# Patient Record
Sex: FEMALE | Race: BLACK OR AFRICAN AMERICAN | NOT HISPANIC OR LATINO | Employment: FULL TIME | URBAN - METROPOLITAN AREA
[De-identification: names, ages, dates, MRNs, and addresses within clinical notes are randomized per-mention and may not be internally consistent; named-entity substitution may affect disease eponyms.]

---

## 2019-03-05 ENCOUNTER — TELEPHONE (OUTPATIENT)
Dept: INTERNAL MEDICINE | Facility: CLINIC | Age: 49
End: 2019-03-05

## 2019-03-05 NOTE — TELEPHONE ENCOUNTER
----- Message from Shari Briceno sent at 3/5/2019  8:38 AM EST -----  Contact: Patient  Patient called and requested that you give her a call back in regards to back pain. Please call @ [phone]. Thank you.

## 2019-03-05 NOTE — TELEPHONE ENCOUNTER
S/W PT, STATES SHE IS HAVING ACUTE BACK PAIN THAT HAS WORSENED AND NEEDS TO BE SEEN ASAP.  STATES SHE IS PT OF DR ALICEA BUT HAS NOT BEEN SEEN IN FEW YEARS.  INFORMED DR ALICEA OUT OF [facility] THIS WEEK.  STATES SHE CANNOT WAIT.  EXPL CAN SCHEDULE WITH EXTENDER FOR ACUTE BACK PAIN BUT ALSO NEEDS TO SCHEDULE RE-EST APPT WITH DR ALICEA UPON RETURN.  VERB UNDERSTANDING AND AGREEMENT. TRANSFERRED TO  TO SCHEDULE APPT.

## 2021-12-28 ENCOUNTER — HOSPITAL ENCOUNTER (EMERGENCY)
Facility: HOSPITAL | Age: 51
Discharge: HOME | End: 2021-12-28
Attending: EMERGENCY MEDICINE | Admitting: EMERGENCY MEDICINE

## 2021-12-28 VITALS
DIASTOLIC BLOOD PRESSURE: 106 MMHG | HEIGHT: 64 IN | BODY MASS INDEX: 26.63 KG/M2 | SYSTOLIC BLOOD PRESSURE: 181 MMHG | RESPIRATION RATE: 18 BRPM | OXYGEN SATURATION: 98 % | WEIGHT: 156 LBS | HEART RATE: 103 BPM | TEMPERATURE: 102.8 F

## 2021-12-28 DIAGNOSIS — R50.9 FEBRILE ILLNESS: ICD-10-CM

## 2021-12-28 DIAGNOSIS — U07.1 COVID-19 VIRUS INFECTION: Primary | ICD-10-CM

## 2021-12-28 LAB
FLUAV RNA RESP QL NAA+PROBE: NOT DETECTED
FLUBV RNA RESP QL NAA+PROBE: NOT DETECTED
SARS-COV-2 RNA RESP QL NAA+PROBE: DETECTED

## 2021-12-28 PROCEDURE — C9803 HOPD COVID-19 SPEC COLLECT: HCPCS | Performed by: EMERGENCY MEDICINE

## 2021-12-28 PROCEDURE — 87636 SARSCOV2 & INF A&B AMP PRB: CPT | Performed by: EMERGENCY MEDICINE

## 2021-12-28 PROCEDURE — 99283 EMERGENCY DEPT VISIT LOW MDM: CPT

## 2021-12-28 PROCEDURE — 63710000001 ONDANSETRON ODT 4 MG TABLET DISPERSIBLE: Performed by: EMERGENCY MEDICINE

## 2021-12-28 RX ORDER — ONDANSETRON 4 MG/1
4 TABLET, ORALLY DISINTEGRATING ORAL 4 TIMES DAILY PRN
Qty: 15 TABLET | Refills: 0 | Status: SHIPPED | OUTPATIENT
Start: 2021-12-28 | End: 2022-05-27

## 2021-12-28 RX ORDER — ACETAMINOPHEN 325 MG/1
650 TABLET ORAL ONCE
Status: COMPLETED | OUTPATIENT
Start: 2021-12-28 | End: 2021-12-28

## 2021-12-28 RX ORDER — NAPROXEN 500 MG/1
500 TABLET ORAL 2 TIMES DAILY WITH MEALS
Qty: 20 TABLET | Refills: 0 | OUTPATIENT
Start: 2021-12-28 | End: 2021-12-28 | Stop reason: SDUPTHER

## 2021-12-28 RX ORDER — ONDANSETRON 4 MG/1
4 TABLET, ORALLY DISINTEGRATING ORAL 4 TIMES DAILY PRN
Qty: 15 TABLET | Refills: 0 | OUTPATIENT
Start: 2021-12-28 | End: 2021-12-28 | Stop reason: SDUPTHER

## 2021-12-28 RX ORDER — ACETAMINOPHEN 500 MG
500 TABLET ORAL EVERY 4 HOURS PRN
Qty: 20 TABLET | Refills: 0 | Status: SHIPPED | OUTPATIENT
Start: 2021-12-28

## 2021-12-28 RX ORDER — ONDANSETRON 4 MG/1
4 TABLET, ORALLY DISINTEGRATING ORAL ONCE
Status: COMPLETED | OUTPATIENT
Start: 2021-12-28 | End: 2021-12-28

## 2021-12-28 RX ORDER — ONDANSETRON 4 MG/1
4 TABLET, ORALLY DISINTEGRATING ORAL ONCE
Status: DISCONTINUED | OUTPATIENT
Start: 2021-12-28 | End: 2021-12-28

## 2021-12-28 RX ORDER — ACETAMINOPHEN 500 MG
500 TABLET ORAL EVERY 4 HOURS PRN
Qty: 20 TABLET | Refills: 0 | OUTPATIENT
Start: 2021-12-28 | End: 2021-12-28 | Stop reason: SDUPTHER

## 2021-12-28 RX ORDER — NAPROXEN 500 MG/1
500 TABLET ORAL 2 TIMES DAILY WITH MEALS
Qty: 20 TABLET | Refills: 0 | Status: SHIPPED | OUTPATIENT
Start: 2021-12-28 | End: 2022-05-27

## 2021-12-28 RX ADMIN — ONDANSETRON HYDROCHLORIDE 4 MG: 4 TABLET, ORALLY DISINTEGRATING ORAL at 22:30

## 2021-12-28 RX ADMIN — ACETAMINOPHEN 650 MG: 325 TABLET, FILM COATED ORAL at 22:30

## 2021-12-29 ENCOUNTER — READMISSION MANAGEMENT (OUTPATIENT)
Dept: CALL CENTER | Facility: HOSPITAL | Age: 51
End: 2021-12-29

## 2021-12-29 NOTE — OUTREACH NOTE
Prep Survey      Responses   [facility] patient discharged from? [place]   Is LACE score < 7 ? Yes   Emergency Room discharge w/ pulse ox? Yes   Eligibility Readm Mgmt   Discharge diagnosis Covid 19   Does the patient have one of the following disease processes/diagnoses(primary or secondary)? COVID-19   Does the patient have Home health ordered? No   Is there a DME ordered? Yes   What DME was ordered? Sent home w/ pulse ox   General alerts for this patient ED discharge - call x 3 only   Prep survey completed? Yes          Jaki Latham RN

## 2021-12-29 NOTE — OUTREACH NOTE
COVID-19 Week 1 Survey      Responses   [facility] patient discharged from? [place]   Does the patient have one of the following disease processes/diagnoses(primary or secondary)? COVID-19   COVID-19 underlying condition? None   Call Number Call 1   Week 1 Call successful? Yes   Call start time [phone]   Call end time 1122   Person spoke with today (if not patient) and relationship Sergio-friend   COVID-19 call completed? Yes   Wrap up additional comments Brief call with friendSergio- has not talked with patient, but has texted her. States will alert her to another f/u call will be made tomorrow.          Chioma Townsend RN

## 2021-12-30 ENCOUNTER — READMISSION MANAGEMENT (OUTPATIENT)
Dept: CALL CENTER | Facility: HOSPITAL | Age: 51
End: 2021-12-30

## 2021-12-30 NOTE — OUTREACH NOTE
COVID-19 Week 1 Survey      Responses   [facility] patient discharged from? [place]   Does the patient have one of the following disease processes/diagnoses(primary or secondary)? COVID-19   COVID-19 underlying condition? None   Call Number Call 2   Week 1 Call successful? No   Discharge diagnosis Covid 19          Ty Johnson RN

## 2021-12-31 ENCOUNTER — READMISSION MANAGEMENT (OUTPATIENT)
Dept: CALL CENTER | Facility: HOSPITAL | Age: 51
End: 2021-12-31

## 2021-12-31 NOTE — OUTREACH NOTE
COVID-19 Week 1 Survey      Responses   [facility] patient discharged from? [place]   Does the patient have one of the following disease processes/diagnoses(primary or secondary)? COVID-19   COVID-19 underlying condition? None   Call Number Call 3   Week 1 Call successful? No   Discharge diagnosis Covid 19          Viola Reese RN

## 2022-01-05 ENCOUNTER — TELEPHONE (OUTPATIENT)
Dept: INTERNAL MEDICINE | Facility: CLINIC | Age: 52
End: 2022-01-05

## 2022-05-23 LAB
BASOPHILS # BLD AUTO: 0.03 10*3/MM3 (ref 0–0.2)
BASOPHILS NFR BLD AUTO: 0.4 % (ref 0–1.5)
DEPRECATED RDW RBC AUTO: 46.4 FL (ref 37–54)
EOSINOPHIL # BLD AUTO: 0.08 10*3/MM3 (ref 0–0.4)
EOSINOPHIL NFR BLD AUTO: 1.1 % (ref 0.3–6.2)
ERYTHROCYTE [DISTWIDTH] IN BLOOD BY AUTOMATED COUNT: 14.9 % (ref 12.3–15.4)
HCT VFR BLD AUTO: 41.7 % (ref 34–46.6)
HGB BLD-MCNC: 12.9 G/DL (ref 12–15.9)
IMM GRANULOCYTES # BLD AUTO: 0.02 10*3/MM3 (ref 0–0.05)
IMM GRANULOCYTES NFR BLD AUTO: 0.3 % (ref 0–0.5)
LYMPHOCYTES # BLD AUTO: 1.39 10*3/MM3 (ref 0.7–3.1)
LYMPHOCYTES NFR BLD AUTO: 18.8 % (ref 19.6–45.3)
MCH RBC QN AUTO: 26.4 PG (ref 26.6–33)
MCHC RBC AUTO-ENTMCNC: 30.9 G/DL (ref 31.5–35.7)
MCV RBC AUTO: 85.3 FL (ref 79–97)
MONOCYTES # BLD AUTO: 0.61 10*3/MM3 (ref 0.1–0.9)
MONOCYTES NFR BLD AUTO: 8.2 % (ref 5–12)
NEUTROPHILS NFR BLD AUTO: 5.27 10*3/MM3 (ref 1.7–7)
NEUTROPHILS NFR BLD AUTO: 71.2 % (ref 42.7–76)
NRBC BLD AUTO-RTO: 0 /100 WBC (ref 0–0.2)
PLATELET # BLD AUTO: 277 10*3/MM3 (ref 140–450)
PMV BLD AUTO: 10.4 FL (ref 6–12)
RBC # BLD AUTO: 4.89 10*6/MM3 (ref 3.77–5.28)
WBC NRBC COR # BLD: 7.4 10*3/MM3 (ref 3.4–10.8)

## 2022-05-23 PROCEDURE — 93005 ELECTROCARDIOGRAM TRACING: CPT | Performed by: EMERGENCY MEDICINE

## 2022-05-23 PROCEDURE — 96374 THER/PROPH/DIAG INJ IV PUSH: CPT

## 2022-05-23 PROCEDURE — 36415 COLL VENOUS BLD VENIPUNCTURE: CPT

## 2022-05-23 PROCEDURE — 99283 EMERGENCY DEPT VISIT LOW MDM: CPT

## 2022-05-23 PROCEDURE — 80053 COMPREHEN METABOLIC PANEL: CPT

## 2022-05-23 PROCEDURE — 85025 COMPLETE CBC W/AUTO DIFF WBC: CPT

## 2022-05-24 ENCOUNTER — HOSPITAL ENCOUNTER (EMERGENCY)
Facility: HOSPITAL | Age: 52
Discharge: HOME | End: 2022-05-24
Attending: EMERGENCY MEDICINE | Admitting: EMERGENCY MEDICINE

## 2022-05-24 VITALS
DIASTOLIC BLOOD PRESSURE: 116 MMHG | WEIGHT: 157 LBS | HEART RATE: 66 BPM | RESPIRATION RATE: 18 BRPM | SYSTOLIC BLOOD PRESSURE: 162 MMHG | OXYGEN SATURATION: 100 % | TEMPERATURE: 98.2 F | BODY MASS INDEX: 26.16 KG/M2 | HEIGHT: 65 IN

## 2022-05-24 DIAGNOSIS — I10 UNCONTROLLED HYPERTENSION: Primary | ICD-10-CM

## 2022-05-24 LAB
ALBUMIN SERPL-MCNC: 4.2 G/DL (ref 3.5–5.2)
ALBUMIN/GLOB SERPL: 1.1 G/DL
ALP SERPL-CCNC: 97 U/L (ref 39–117)
ALT SERPL W P-5'-P-CCNC: 9 U/L (ref 1–33)
ANION GAP SERPL CALCULATED.3IONS-SCNC: 14 MMOL/L (ref 5–15)
AST SERPL-CCNC: 20 U/L (ref 1–32)
BILIRUB SERPL-MCNC: 0.4 MG/DL (ref 0–1.2)
BUN SERPL-MCNC: 19 MG/DL (ref 6–20)
BUN/CREAT SERPL: 22.1 (ref 7–25)
CALCIUM SPEC-SCNC: 9.8 MG/DL (ref 8.6–10.5)
CHLORIDE SERPL-SCNC: 103 MMOL/L (ref 98–107)
CO2 SERPL-SCNC: 27 MMOL/L (ref 22–29)
CREAT SERPL-MCNC: 0.86 MG/DL (ref 0.57–1)
EGFRCR SERPLBLD CKD-EPI 2021: 81.4 ML/MIN/1.73
GLOBULIN UR ELPH-MCNC: 3.8 GM/DL
GLUCOSE SERPL-MCNC: 101 MG/DL (ref 65–99)
POTASSIUM SERPL-SCNC: 3.8 MMOL/L (ref 3.5–5.2)
PROT SERPL-MCNC: 8 G/DL (ref 6–8.5)
SODIUM SERPL-SCNC: 144 MMOL/L (ref 136–145)
TROPONIN T SERPL-MCNC: <0.01 NG/ML (ref 0–0.03)

## 2022-05-24 PROCEDURE — 96374 THER/PROPH/DIAG INJ IV PUSH: CPT

## 2022-05-24 PROCEDURE — 93005 ELECTROCARDIOGRAM TRACING: CPT | Performed by: EMERGENCY MEDICINE

## 2022-05-24 PROCEDURE — 84484 ASSAY OF TROPONIN QUANT: CPT | Performed by: EMERGENCY MEDICINE

## 2022-05-24 RX ORDER — AMLODIPINE BESYLATE 5 MG/1
5 TABLET ORAL DAILY
Qty: 30 TABLET | Refills: 1 | Status: SHIPPED | OUTPATIENT
Start: 2022-05-24

## 2022-05-24 RX ORDER — LABETALOL HYDROCHLORIDE 5 MG/ML
20 INJECTION, SOLUTION INTRAVENOUS ONCE
Status: COMPLETED | OUTPATIENT
Start: 2022-05-24 | End: 2022-05-24

## 2022-05-24 RX ADMIN — LABETALOL 20 MG/4 ML (5 MG/ML) INTRAVENOUS SYRINGE 20 MG: at 01:48

## 2022-05-24 NOTE — ED PROVIDER NOTES
Subjective   Pt presents with elevated blood pressure.  She had some dizziness at work tonight, checked BP and it was very high so she left work and came in . She had some mild chest discomfort around 1830 that quickly resolved.  No headaches.  Some blurred vision but she says that's an ongoing concern and she sees multiple optometrists.  She has not seen a PCP in quite some time.    She checks her BP weekly and it's usually 140-160 systolic, ever since she had covid about 6 months ago.  Much higher today.      History provided by:  Patient      Review of Systems   Constitutional: Negative for fever.   Eyes: Positive for visual disturbance.   Respiratory: Negative for shortness of breath.    Cardiovascular: Negative for chest pain.   Neurological: Positive for dizziness. Negative for speech difficulty.   All other systems reviewed and are negative.      No past medical history on file.    Allergies   Allergen Reactions   • Penicillins Unknown - Low Severity       No past surgical history on file.    No family history on file.    Social History     Socioeconomic History   • Marital status:            Objective   Physical Exam  Vitals and nursing note reviewed.   Constitutional:       General: She is not in acute distress.     Appearance: Normal appearance. She is not ill-appearing.   HENT:      Head: Normocephalic and atraumatic.      Mouth/Throat:      Mouth: Mucous membranes are moist.   Eyes:      General: No scleral icterus.        Right eye: No discharge.         Left eye: No discharge.      Extraocular Movements: Extraocular movements intact.      Conjunctiva/sclera: Conjunctivae normal.      Pupils: Pupils are equal, round, and reactive to light.   Cardiovascular:      Rate and Rhythm: Normal rate and regular rhythm.      Heart sounds: No murmur heard.  Pulmonary:      Effort: Pulmonary effort is normal. No respiratory distress.      Breath sounds: Normal breath sounds. No wheezing.   Abdominal:       General: Bowel sounds are normal. There is no distension.      Palpations: Abdomen is soft.      Tenderness: There is no abdominal tenderness. There is no guarding or rebound.   Musculoskeletal:         General: No swelling. Normal range of motion.      Cervical back: Normal range of motion and neck supple.   Skin:     General: Skin is warm and dry.      Findings: No rash.   Neurological:      General: No focal deficit present.      Mental Status: She is alert and oriented to person, place, and time. Mental status is at baseline.      Comments: Speech fluent and articulate.  No facial droop.  5/5 strength in arms and legs.  Normal .  Mentation normal.   Psychiatric:         Mood and Affect: Mood normal.         Behavior: Behavior normal.         Thought Content: Thought content normal.         Procedures           ED Course         EKG NSR.  Labs benign.       BP improved with meds.  Will start antihypertensive.  Discussed BID BP measurements at home, take to followup for med adjustment.                                   MDM  Number of Diagnoses or Management Options     Amount and/or Complexity of Data Reviewed  Clinical lab tests: ordered and reviewed  Independent visualization of images, tracings, or specimens: yes        Final diagnoses:   Uncontrolled hypertension       ED Disposition  ED Disposition     ED Disposition   Discharge    Condition   Stable    Comment   --             Abdullahi Petersen MD  [address]  [facility] [id number] [phone]    In 1 week      [facility]  [address] [facility] [address] [phone]  Go to   If symptoms worsen         Medication List      New Prescriptions    amLODIPine 5 MG tablet  Commonly known as: NORVASC  Take 1 tablet by mouth Daily.           Where to Get Your Medications      These medications were sent to [id number] - [place], [place] - [address] [facility] - [phone]  - [phone] FX  [address], [facility] [address]    Phone: [phone]   · amLODIPine 5 MG tablet          Rick Rodriguez MD  05/26/22 0838

## 2022-05-24 NOTE — DISCHARGE INSTRUCTIONS
Check your blood pressure twice daily (morning and evening) and keep a log.  Take this log to your follow up visits to discuss whether medication changes are necessary.

## 2022-05-27 ENCOUNTER — OFFICE VISIT (OUTPATIENT)
Dept: INTERNAL MEDICINE | Facility: CLINIC | Age: 52
End: 2022-05-27

## 2022-05-27 VITALS
SYSTOLIC BLOOD PRESSURE: 122 MMHG | OXYGEN SATURATION: 98 % | RESPIRATION RATE: 18 BRPM | BODY MASS INDEX: 27.18 KG/M2 | TEMPERATURE: 97.8 F | HEIGHT: 64 IN | DIASTOLIC BLOOD PRESSURE: 82 MMHG | HEART RATE: 88 BPM | WEIGHT: 159.2 LBS

## 2022-05-27 DIAGNOSIS — F41.9 ANXIETY: ICD-10-CM

## 2022-05-27 DIAGNOSIS — I10 HYPERTENSION, UNSPECIFIED TYPE: Primary | ICD-10-CM

## 2022-05-27 DIAGNOSIS — R07.9 CHEST PAIN, UNSPECIFIED TYPE: ICD-10-CM

## 2022-05-27 DIAGNOSIS — N95.1 PERIMENOPAUSAL: ICD-10-CM

## 2022-05-27 LAB
QT INTERVAL: 412 MS
QT INTERVAL: 448 MS
QTC INTERVAL: 441 MS
QTC INTERVAL: 458 MS

## 2022-05-27 PROCEDURE — 99204 OFFICE O/P NEW MOD 45 MIN: CPT | Performed by: NURSE PRACTITIONER

## 2022-05-27 RX ORDER — HYDROCHLOROTHIAZIDE 25 MG/1
25 TABLET ORAL DAILY
Qty: 30 TABLET | Refills: 1 | Status: SHIPPED | OUTPATIENT
Start: 2022-05-27 | End: 2022-10-14

## 2022-05-27 RX ORDER — BUSPIRONE HYDROCHLORIDE 10 MG/1
10 TABLET ORAL 3 TIMES DAILY
Qty: 90 TABLET | Refills: 1 | Status: SHIPPED | OUTPATIENT
Start: 2022-05-27 | End: 2022-09-01

## 2022-05-27 NOTE — PROGRESS NOTES
Chief Complaint  Hypertension and Anxiety (Establish care)    Andrea Quijano presents to [facility]  History of Present Illness  Patient new to me and new to establish care in our practice.      Patient to be set up with Dr. Petersen his PCP.  Patient is following up ED visit for hypertension.      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    Patient was seen in the ED on 5/24/2022 presents with dizziness at work and check blood pressure which was high.  She left work and came to the ED due to mild chest discomfort.  Chest pain did resolve.  She has had associated symptoms which included blurred vision.  She had seen optometry and had exams due to this.    Physical exam was normal and EKG had normal sinus rhythm labs completed were without acute abnormal finding and blood pressure improved with medication treatment.  She is started on amlodipine and instructed to check her blood pressure at home and to follow-up with new PCP.    Her labs and EKG were reviewed.  EKG normal sinus rhythm.  Her labs noted a troponin was normal her CBC and CMP were near normal without acute abnormal findings.    Review of systems positive for visual changes no headache dizziness palpitations syncope +CP N dizziness intermittent. no shortness of breath swelling abdominal pain no change in bowel bladder habits no lumps no bumps no rashes    Objective   Vital Signs:   /82 (BP Location: Right arm, Patient  "Position: Sitting, Cuff Size: Adult)   Pulse 88   Temp 97.8 °F (36.6 °C) (Temporal)   Resp 18   Ht 161.3 cm (63.5\")   Wt 72.2 kg (159 lb 3.2 oz)   SpO2 98%   BMI 27.76 kg/m²     Physical Exam  Vitals and nursing note reviewed.   Constitutional:       Appearance: She is well-developed.   HENT:      Head: Normocephalic and atraumatic.      Right Ear: External ear normal.      Left Ear: External ear normal.      Nose: Nose normal.   Neck:      Thyroid: No thyromegaly.   Cardiovascular:      Rate and Rhythm: Normal rate and regular rhythm.   Pulmonary:      Effort: Pulmonary effort is normal.      Breath sounds: Normal breath sounds.   Abdominal:      General: Bowel sounds are normal. There is no distension.      Palpations: Abdomen is soft.      Tenderness: There is no abdominal tenderness.   Lymphadenopathy:      Cervical: No cervical adenopathy.   Skin:     Capillary Refill: Capillary refill takes 2 to 3 seconds.   Neurological:      Mental Status: She is alert and oriented to person, place, and time.   Psychiatric:         Behavior: Behavior normal.        Result Review :                 Assessment and Plan    There are no diagnoses linked to this encounter.      Discussed the patient's BMI with her. BMI is above normal parameters. Recommendations include: educational material.      Follow Up   No follow-ups on file.  Patient was given instructions and counseling regarding her condition or for health maintenance advice. Please see specific information pulled into the AVS if appropriate.     RTC/call  If symptoms worsen  Meds MOA and SE's reviewed and pt v/u  "

## 2022-05-27 NOTE — PATIENT INSTRUCTIONS
"https://www.nhlbi.nih.gov/files/docs/public/heart/dash_brief.pdf\">   DASH Eating Plan  DASH stands for Dietary Approaches to Stop Hypertension. The DASH eating plan is a healthy eating plan that has been shown to:  Reduce high blood pressure (hypertension).  Reduce your risk for type 2 diabetes, heart disease, and stroke.  Help with weight loss.  What are tips for following this plan?  Reading food labels  Check food labels for the amount of salt (sodium) per serving. Choose foods with less than 5 percent of the Daily Value of sodium. Generally, foods with less than 300 milligrams (mg) of sodium per serving fit into this eating plan.  To find whole grains, look for the word \"whole\" as the first word in the ingredient list.  Shopping  Buy products labeled as \"low-sodium\" or \"no salt added.\"  Buy fresh foods. Avoid canned foods and pre-made or frozen meals.  Cooking  Avoid adding salt when cooking. Use salt-free seasonings or herbs instead of table salt or sea salt. Check with your health care provider or pharmacist before using salt substitutes.  Do not escalante foods. Cook foods using healthy methods such as baking, boiling, grilling, roasting, and broiling instead.  Cook with heart-healthy oils, such as olive, canola, avocado, soybean, or sunflower oil.  Meal planning    Eat a balanced diet that includes:  4 or more servings of fruits and 4 or more servings of vegetables each day. Try to fill one-half of your plate with fruits and vegetables.  6-8 servings of whole grains each day.  Less than 6 oz (170 g) of lean meat, poultry, or fish each day. A 3-oz (85-g) serving of meat is about the same size as a deck of cards. One egg equals 1 oz (28 g).  2-3 servings of low-fat dairy each day. One serving is 1 cup (237 mL).  1 serving of nuts, seeds, or beans 5 times each week.  2-3 servings of heart-healthy fats. Healthy fats called omega-3 fatty acids are found in foods such as walnuts, flaxseeds, fortified milks, and eggs. " These fats are also found in cold-water fish, such as sardines, salmon, and mackerel.  Limit how much you eat of:  Canned or prepackaged foods.  Food that is high in trans fat, such as some fried foods.  Food that is high in saturated fat, such as fatty meat.  Desserts and other sweets, sugary drinks, and other foods with added sugar.  Full-fat dairy products.  Do not salt foods before eating.  Do not eat more than 4 egg yolks a week.  Try to eat at least 2 vegetarian meals a week.  Eat more home-cooked food and less restaurant, buffet, and fast food.    Lifestyle  When eating at a restaurant, ask that your food be prepared with less salt or no salt, if possible.  If you drink alcohol:  Limit how much you use to:  0-1 drink a day for women who are not pregnant.  0-2 drinks a day for men.  Be aware of how much alcohol is in your drink. In the [place] one drink equals one 12 oz bottle of beer (355 mL), one 5 oz glass of wine (148 mL), or one 1½ oz glass of hard liquor (44 mL).  General information  Avoid eating more than 2,300 mg of salt a day. If you have hypertension, you may need to reduce your sodium intake to 1,500 mg a day.  Work with your health care provider to maintain a healthy body weight or to lose weight. Ask what an ideal weight is for you.  Get at least 30 minutes of exercise that causes your heart to beat faster (aerobic exercise) most days of the week. Activities may include walking, swimming, or biking.  Work with your health care provider or dietitian to adjust your eating plan to your individual calorie needs.  What foods should I eat?  Fruits  All fresh, dried, or frozen fruit. Canned fruit in natural juice (without added sugar).  Vegetables  Fresh or frozen vegetables (raw, steamed, roasted, or grilled). Low-sodium or reduced-sodium tomato and vegetable juice. Low-sodium or reduced-sodium tomato sauce and tomato paste. Low-sodium or reduced-sodium canned vegetables.  Grains  Whole-grain or  whole-wheat bread. Whole-grain or whole-wheat pasta. Brown rice. Oatmeal. Quinoa. Bulgur. Whole-grain and low-sodium cereals. Rafia bread. Low-fat, low-sodium crackers. Whole-wheat flour tortillas.  Meats and other proteins  Skinless chicken or turkey. Ground chicken or turkey. Pork with fat trimmed off. Fish and seafood. Egg whites. Dried beans, peas, or lentils. Unsalted nuts, nut butters, and seeds. Unsalted canned beans. Lean cuts of beef with fat trimmed off. Low-sodium, lean precooked or cured meat, such as sausages or meat loaves.  Dairy  Low-fat (1%) or fat-free (skim) milk. Reduced-fat, low-fat, or fat-free cheeses. Nonfat, low-sodium ricotta or cottage cheese. Low-fat or nonfat yogurt. Low-fat, low-sodium cheese.  Fats and oils  Soft margarine without trans fats. Vegetable oil. Reduced-fat, low-fat, or light mayonnaise and salad dressings (reduced-sodium). Canola, safflower, olive, avocado, soybean, and sunflower oils. Avocado.  Seasonings and condiments  Herbs. Spices. Seasoning mixes without salt.  Other foods  Unsalted popcorn and pretzels. Fat-free sweets.  The items listed above may not be a complete list of foods and beverages you can eat. Contact a dietitian for more information.  What foods should I avoid?  Fruits  Canned fruit in a light or heavy syrup. Fried fruit. Fruit in cream or butter sauce.  Vegetables  Creamed or fried vegetables. Vegetables in a cheese sauce. Regular canned vegetables (not low-sodium or reduced-sodium). Regular canned tomato sauce and paste (not low-sodium or reduced-sodium). Regular tomato and vegetable juice (not low-sodium or reduced-sodium). Pickles. Olives.  Grains  Baked goods made with fat, such as croissants, muffins, or some breads. Dry pasta or rice meal packs.  Meats and other proteins  Fatty cuts of meat. Ribs. Fried meat. Putnam. Bologna, salami, and other precooked or cured meats, such as sausages or meat loaves. Fat from the back of a pig (fatback).  Bratwurst. Salted nuts and seeds. Canned beans with added salt. Canned or smoked fish. Whole eggs or egg yolks. Chicken or turkey with skin.  Dairy  Whole or 2% milk, cream, and half-and-half. Whole or full-fat cream cheese. Whole-fat or sweetened yogurt. Full-fat cheese. Nondairy creamers. Whipped toppings. Processed cheese and cheese spreads.  Fats and oils  Butter. Stick margarine. Lard. Shortening. Ghee. Putnam fat. Tropical oils, such as coconut, palm kernel, or palm oil.  Seasonings and condiments  Onion salt, garlic salt, seasoned salt, table salt, and sea salt. Worcestershire sauce. Tartar sauce. Barbecue sauce. Teriyaki sauce. Soy sauce, including reduced-sodium. Steak sauce. Canned and packaged gravies. Fish sauce. Oyster sauce. Cocktail sauce. Store-bought horseradish. Ketchup. Mustard. Meat flavorings and tenderizers. Bouillon cubes. Hot sauces. Pre-made or packaged marinades. Pre-made or packaged taco seasonings. Relishes. Regular salad dressings.  Other foods  Salted popcorn and pretzels.  The items listed above may not be a complete list of foods and beverages you should avoid. Contact a dietitian for more information.  Where to find more information  National Heart, Lung, and Blood [place]: www.nhlbi.nih.gov  American Heart Association: www.heart.org  Academy of Nutrition and Dietetics: www.eatright.org  National Kidney Foundation: www.kidney.org  Summary  The DASH eating plan is a healthy eating plan that has been shown to reduce high blood pressure (hypertension). It may also reduce your risk for type 2 diabetes, heart disease, and stroke.  When on the DASH eating plan, aim to eat more fresh fruits and vegetables, whole grains, lean proteins, low-fat dairy, and heart-healthy fats.  With the DASH eating plan, you should limit salt (sodium) intake to 2,300 mg a day. If you have hypertension, you may need to reduce your sodium intake to 1,500 mg a day.  Work with your health care provider or  dietitian to adjust your eating plan to your individual calorie needs.  This information is not intended to replace advice given to you by your health care provider. Make sure you discuss any questions you have with your health care provider.  Document Revised: 11/20/2020 Document Reviewed: 11/20/2020  Elsevier Patient Education © 2021 [employer]

## 2022-09-01 ENCOUNTER — LAB (OUTPATIENT)
Dept: LAB | Facility: HOSPITAL | Age: 52
End: 2022-09-01

## 2022-09-01 ENCOUNTER — OFFICE VISIT (OUTPATIENT)
Dept: INTERNAL MEDICINE | Facility: CLINIC | Age: 52
End: 2022-09-01

## 2022-09-01 VITALS
SYSTOLIC BLOOD PRESSURE: 132 MMHG | TEMPERATURE: 98 F | BODY MASS INDEX: 27.58 KG/M2 | HEART RATE: 60 BPM | DIASTOLIC BLOOD PRESSURE: 80 MMHG | WEIGHT: 158.2 LBS | RESPIRATION RATE: 20 BRPM

## 2022-09-01 DIAGNOSIS — R53.83 FATIGUE, UNSPECIFIED TYPE: ICD-10-CM

## 2022-09-01 DIAGNOSIS — Z23 IMMUNIZATION DUE: ICD-10-CM

## 2022-09-01 DIAGNOSIS — Z12.11 SCREENING FOR COLON CANCER: ICD-10-CM

## 2022-09-01 DIAGNOSIS — I10 ESSENTIAL HYPERTENSION: Primary | ICD-10-CM

## 2022-09-01 DIAGNOSIS — Z13.220 SCREENING FOR LIPID DISORDERS: ICD-10-CM

## 2022-09-01 DIAGNOSIS — Z12.31 ENCOUNTER FOR SCREENING MAMMOGRAM FOR BREAST CANCER: ICD-10-CM

## 2022-09-01 DIAGNOSIS — Z00.00 PHYSICAL EXAM: ICD-10-CM

## 2022-09-01 LAB
BASOPHILS # BLD AUTO: 0.03 10*3/MM3 (ref 0–0.2)
BASOPHILS NFR BLD AUTO: 0.7 % (ref 0–1.5)
DEPRECATED RDW RBC AUTO: 42.9 FL (ref 37–54)
EOSINOPHIL # BLD AUTO: 0.25 10*3/MM3 (ref 0–0.4)
EOSINOPHIL NFR BLD AUTO: 5.6 % (ref 0.3–6.2)
ERYTHROCYTE [DISTWIDTH] IN BLOOD BY AUTOMATED COUNT: 14.2 % (ref 12.3–15.4)
HCT VFR BLD AUTO: 39.5 % (ref 34–46.6)
HGB BLD-MCNC: 12.8 G/DL (ref 12–15.9)
IMM GRANULOCYTES # BLD AUTO: 0.01 10*3/MM3 (ref 0–0.05)
IMM GRANULOCYTES NFR BLD AUTO: 0.2 % (ref 0–0.5)
LYMPHOCYTES # BLD AUTO: 1.24 10*3/MM3 (ref 0.7–3.1)
LYMPHOCYTES NFR BLD AUTO: 27.9 % (ref 19.6–45.3)
MCH RBC QN AUTO: 27.7 PG (ref 26.6–33)
MCHC RBC AUTO-ENTMCNC: 32.4 G/DL (ref 31.5–35.7)
MCV RBC AUTO: 85.5 FL (ref 79–97)
MONOCYTES # BLD AUTO: 0.37 10*3/MM3 (ref 0.1–0.9)
MONOCYTES NFR BLD AUTO: 8.3 % (ref 5–12)
NEUTROPHILS NFR BLD AUTO: 2.55 10*3/MM3 (ref 1.7–7)
NEUTROPHILS NFR BLD AUTO: 57.3 % (ref 42.7–76)
NRBC BLD AUTO-RTO: 0 /100 WBC (ref 0–0.2)
PLATELET # BLD AUTO: 252 10*3/MM3 (ref 140–450)
PMV BLD AUTO: 10.8 FL (ref 6–12)
RBC # BLD AUTO: 4.62 10*6/MM3 (ref 3.77–5.28)
WBC NRBC COR # BLD: 4.45 10*3/MM3 (ref 3.4–10.8)

## 2022-09-01 PROCEDURE — 99396 PREV VISIT EST AGE 40-64: CPT | Performed by: INTERNAL MEDICINE

## 2022-09-01 PROCEDURE — 90471 IMMUNIZATION ADMIN: CPT | Performed by: INTERNAL MEDICINE

## 2022-09-01 PROCEDURE — 84439 ASSAY OF FREE THYROXINE: CPT | Performed by: INTERNAL MEDICINE

## 2022-09-01 PROCEDURE — 90715 TDAP VACCINE 7 YRS/> IM: CPT | Performed by: INTERNAL MEDICINE

## 2022-09-01 PROCEDURE — 80050 GENERAL HEALTH PANEL: CPT | Performed by: INTERNAL MEDICINE

## 2022-09-01 PROCEDURE — 80061 LIPID PANEL: CPT | Performed by: INTERNAL MEDICINE

## 2022-09-01 PROCEDURE — 86803 HEPATITIS C AB TEST: CPT | Performed by: INTERNAL MEDICINE

## 2022-09-01 RX ORDER — BUPROPION HYDROCHLORIDE 150 MG/1
150 TABLET ORAL DAILY
Qty: 30 TABLET | Refills: 5 | Status: SHIPPED | OUTPATIENT
Start: 2022-09-01

## 2022-09-01 RX ORDER — BETAMETHASONE DIPROPIONATE 0.5 MG/G
1 OINTMENT TOPICAL 2 TIMES DAILY
Qty: 45 G | Refills: 0 | Status: SHIPPED | OUTPATIENT
Start: 2022-09-01

## 2022-09-01 RX ORDER — ONDANSETRON 4 MG/1
4 TABLET, ORALLY DISINTEGRATING ORAL EVERY 8 HOURS PRN
Qty: 30 TABLET | Refills: 2 | Status: SHIPPED | OUTPATIENT
Start: 2022-09-01

## 2022-09-01 NOTE — PROGRESS NOTES
Chief Complaint   Patient presents with   • Follow-up     Follow up from ER visit on May 23, 2022       History of Present Illness      The patient presents for an established patient physical examination and health maintenance visit.    The patient presents for a follow-up related to hypertension. The patient reports that she has had no headaches, chest pain, dyspnea, edema, syncope, blurred vision or palpitations. She states that she is taking her medication as prescribed. She is not having medication side effects.    The patient has complaints of being fatigued. She describes her symptoms as decreased energy and the symptoms have been present for six months.       She denies tearfulness, loneliness, hopelessness, anxiety or suicidal thoughts.       The patient denies a dry cough, a wet cough, wheezing, fever, facial pain, eye drainage, ear pain, ear drainage, nausea, vomiting, diarrhea, sore throat, abdominal pain, nasal discharge, decreased appetite, chills, rectal bleeding, night sweats or myalgias. There are no symptoms of heat intolerance, cold intolerance, excessive hair loss or constipation. The patient denies polyuria, polydypsia or polyphagia. There are no known exposures to mononucleosis. The patient does not exercise regularly.    The patient falls asleep easily and typically does not awaken at night. The patient does not snore and denies gasping or stopping breathing at night. She feels well rested during the day and she denies falling asleep while watching television, while driving or during conversation.       Review of Systems    CONSTITUTIONAL- Reports: Fatigue. Denies: Unexplained Weight Loss, Sweats or Weakness.    NECK- Denies Decreased Range of Motion, Stiffness, Thyroid Nodules, Enlarged Lymph Nodes or Goiter.    EYES- Denies Eye Pain, Eye Redness, Eye Discharge, Decreased Vision, Visual Disturbance, Diplopia, Visual Loss or Swollen Eyelid.    HENT- Denies Sinus Pain, Nasal Congestion,  Decreased Hearing or Tinnitus.    PULMONARY- Denies Sputum Production, Cough, Hemoptysis or Pleuritic Chest Pain.    GASTROINTESTINAL- Denies Heartburn.    CARDIOVASCULAR- Denies Claudication or Irregular Heart Beat.    GENITOURINARY- Denies Dysuria, Hematuria, Urinary Frequency, Urinary Leakage, Pelvic Pain, Vaginal Prolapse, Vaginal Discharge, Dyspareunia or Abnormal Menses.    ENDOCRINE- Denies Memory Loss, Sleep Disturbance or Weight Gain.    NEUROLOGIC- Denies Seizures, Confusion or Excessive Daytime Sleepiness.    MUSCULOSKELETAL- Denies Joint Pain, Joint Stiffness, Decreased Range of Motion, Joint Swelling or Erythema of Joints.    INTEGUMENTARY- Denies Rash, Lumps, Sores, Itching, Dryness, Color Change, Changes in Hair, Brittle Nails, Discolored Nails, Thickened Nails, Growths or Alopecia.    Medications      Current Outpatient Medications:   •  acetaminophen (TYLENOL) 500 MG tablet, Take 1 tablet by mouth Every 4 (Four) Hours As Needed for Mild Pain  or Fever., Disp: 20 tablet, Rfl: 0  •  amLODIPine (NORVASC) 5 MG tablet, Take 1 tablet by mouth Daily., Disp: 30 tablet, Rfl: 1  •  hydroCHLOROthiazide (HYDRODIURIL) 25 MG tablet, Take 1 tablet by mouth Daily., Disp: 30 tablet, Rfl: 1  •  busPIRone (BUSPAR) 10 MG tablet, Take 1 tablet by mouth 3 (Three) Times a Day., Disp: 90 tablet, Rfl: 1     Allergies    Allergies   Allergen Reactions   • Penicillins Unknown - Low Severity       Problem List    There is no problem list on file for this patient.      Medications, Allergies, Problems List and Past History were reviewed and updated.    Physical Examination    /80 (BP Location: Left arm, Patient Position: Sitting, Cuff Size: Adult)   Pulse 60   Temp 98 °F (36.7 °C) (Infrared)   Resp 20   Wt 71.8 kg (158 lb 3.2 oz)   Breastfeeding No   BMI 27.58 kg/m²     HEENT: Head- Normocephalic Atraumatic. Facies- Within normal limits. Pinnas- Normal texture and shape bilaterally. Canals- Normal bilaterally. TMs-  Normal bilaterally. Nares- Patent bilaterally. Nasal Septum- is normal. There is no tenderness to palpation over the frontal or maxillary sinuses. Lids- Normal bilaterally. Conjunctiva- Clear bilaterally. Sclera- Anicteric bilaterally. Oropharynx- Moist with no lesions. Tonsils- No enlargement, erythema or exudate.    Neck: Thyroid- non enlarged, symmetric and has no nodules. No bruits are detected. ROM- Normal Range of Motion with no rigidity.    Lungs: Auscultation- Clear to auscultation bilaterally. There are no retractions, clubbing or cyanosis. The Expiratory to Inspiratory ratio is equal.    Lymph Nodes: Cervical- no enlarged lymph nodes noted. Clavicular- no enlarged supraclavicular lymph nodes noted. Axillary- no enlarged axillary lymph nodes noted. Inguinal- no enlarged inguinal lymph nodes noted.    Cardiovascular: There are no carotid bruits. Heart- Normal Rate with Regular rhythm and no murmurs. There are no gallops. There are no rubs. In the lower extremities there is no edema. The upper extremities do not have edema.    Abdomen: Soft, benign, non-tender with no masses, hernias, organomegaly or scars.    Breast: Normal contours bilaterally with no masses, discharge, skin changes or lumps. There are no scars noted.    Dermatologic: The patient has no worrisome or suspicious skin lesions noted.    Impression and Assessment    Normal Physical Examination.    Encounter for Screening for Malignant Neoplasm of the Colon.    Encounter for Screening Mammogram for Malignant Neoplasm of the Breast.    Encounter for Immunization Administration.    Essential Hypertension.    Fatigue.    Plan    Essential Hypertension Plan: The current plan was continued.    Fatigue Plan: Further plans will be made after test results are received and reviewed.    Counseling was provided regarding: Adequate Aerobic Exercise, Flossing Teeth and Heart Healthy Diet.    The following was ordered for screening and health maintenance:  Cardiac Calcium CT Scan, Colonoscopy, Hepatitis C Antibody, Lipid Profile and Screening Mammogram.    Counseled regarding immunizations and applicable VIS given.    Immunizations Ordered and Administered: Tdap.    Diagnoses and all orders for this visit:    1. Essential hypertension (Primary)  -     Comprehensive Metabolic Panel; Future  -     POC Urinalysis Dipstick, Automated; Future    2. Encounter for screening mammogram for breast cancer  -     Mammo Screening Digital Tomosynthesis Bilateral With CAD; Future    3. Physical exam  -     Hepatitis C Antibody; Future  -     CT Cardiac Calcium Score Without Dye; Future    4. Screening for colon cancer  -     Ambulatory Referral For Screening Colonoscopy    5. Screening for lipid disorders  -     Lipid Panel; Future    6. Fatigue, unspecified type  -     CBC & Differential; Future  -     Comprehensive Metabolic Panel; Future  -     TSH; Future  -     T4, Free; Future    7. Immunization due  -     Tdap Vaccine Greater Than or Equal To 6yo IM        Return to Office    The patient was instructed to return for follow-up in 1 year. The patient was instructed to return sooner if the condition changes, worsens, or does not resolve.  Answers for HPI/ROS submitted by the patient on 8/30/2022  What is the primary reason for your visit?: Physical

## 2022-09-02 LAB
ALBUMIN SERPL-MCNC: 3.9 G/DL (ref 3.5–5.2)
ALBUMIN/GLOB SERPL: 1.2 G/DL
ALP SERPL-CCNC: 81 U/L (ref 39–117)
ALT SERPL W P-5'-P-CCNC: 12 U/L (ref 1–33)
ANION GAP SERPL CALCULATED.3IONS-SCNC: 14.3 MMOL/L (ref 5–15)
AST SERPL-CCNC: 21 U/L (ref 1–32)
BILIRUB SERPL-MCNC: 0.5 MG/DL (ref 0–1.2)
BUN SERPL-MCNC: 18 MG/DL (ref 6–20)
BUN/CREAT SERPL: 22 (ref 7–25)
CALCIUM SPEC-SCNC: 9.7 MG/DL (ref 8.6–10.5)
CHLORIDE SERPL-SCNC: 105 MMOL/L (ref 98–107)
CHOLEST SERPL-MCNC: 158 MG/DL (ref 0–200)
CO2 SERPL-SCNC: 24.7 MMOL/L (ref 22–29)
CREAT SERPL-MCNC: 0.82 MG/DL (ref 0.57–1)
EGFRCR SERPLBLD CKD-EPI 2021: 85.7 ML/MIN/1.73
GLOBULIN UR ELPH-MCNC: 3.2 GM/DL
GLUCOSE SERPL-MCNC: 81 MG/DL (ref 65–99)
HCV AB SER DONR QL: NORMAL
HDLC SERPL-MCNC: 87 MG/DL (ref 40–60)
LDLC SERPL CALC-MCNC: 56 MG/DL (ref 0–100)
LDLC/HDLC SERPL: 0.63 {RATIO}
POTASSIUM SERPL-SCNC: 3.8 MMOL/L (ref 3.5–5.2)
PROT SERPL-MCNC: 7.1 G/DL (ref 6–8.5)
SODIUM SERPL-SCNC: 144 MMOL/L (ref 136–145)
T4 FREE SERPL-MCNC: 1.02 NG/DL (ref 0.93–1.7)
TRIGL SERPL-MCNC: 82 MG/DL (ref 0–150)
TSH SERPL DL<=0.05 MIU/L-ACNC: 0.56 UIU/ML (ref 0.27–4.2)
VLDLC SERPL-MCNC: 15 MG/DL (ref 5–40)

## 2022-10-13 DIAGNOSIS — I10 HYPERTENSION, UNSPECIFIED TYPE: ICD-10-CM

## 2022-10-14 RX ORDER — HYDROCHLOROTHIAZIDE 25 MG/1
25 TABLET ORAL DAILY
Qty: 30 TABLET | Refills: 1 | Status: SHIPPED | OUTPATIENT
Start: 2022-10-14 | End: 2022-12-23

## 2022-11-10 ENCOUNTER — HOSPITAL ENCOUNTER (OUTPATIENT)
Dept: MAMMOGRAPHY | Facility: HOSPITAL | Age: 52
Discharge: HOME | End: 2022-11-10

## 2022-12-23 DIAGNOSIS — I10 HYPERTENSION, UNSPECIFIED TYPE: ICD-10-CM

## 2022-12-23 RX ORDER — HYDROCHLOROTHIAZIDE 25 MG/1
25 TABLET ORAL DAILY
Qty: 30 TABLET | Refills: 1 | Status: SHIPPED | OUTPATIENT
Start: 2022-12-23 | End: 2023-03-03

## 2023-03-03 DIAGNOSIS — I10 HYPERTENSION, UNSPECIFIED TYPE: ICD-10-CM

## 2023-03-03 RX ORDER — HYDROCHLOROTHIAZIDE 25 MG/1
25 TABLET ORAL DAILY
Qty: 30 TABLET | Refills: 1 | Status: SHIPPED | OUTPATIENT
Start: 2023-03-03

## 2023-03-03 NOTE — TELEPHONE ENCOUNTER
Patient due for fasting follow-up for chronic problems.  Please schedule with Dr. Petersen.  Medication refilled.

## 2023-04-12 ENCOUNTER — OFFICE VISIT (OUTPATIENT)
Dept: INTERNAL MEDICINE | Facility: CLINIC | Age: 53
End: 2023-04-12
Payer: COMMERCIAL

## 2023-04-12 VITALS
SYSTOLIC BLOOD PRESSURE: 116 MMHG | WEIGHT: 160 LBS | BODY MASS INDEX: 27.9 KG/M2 | TEMPERATURE: 97.5 F | DIASTOLIC BLOOD PRESSURE: 86 MMHG | RESPIRATION RATE: 20 BRPM | HEART RATE: 90 BPM

## 2023-04-12 DIAGNOSIS — R00.2 PALPITATIONS: ICD-10-CM

## 2023-04-12 DIAGNOSIS — R19.7 DIARRHEA, UNSPECIFIED TYPE: ICD-10-CM

## 2023-04-12 DIAGNOSIS — I10 ESSENTIAL HYPERTENSION: Primary | ICD-10-CM

## 2023-04-12 DIAGNOSIS — R82.998 LEUKOCYTES IN URINE: ICD-10-CM

## 2023-04-12 DIAGNOSIS — R07.9 CHEST PAIN, UNSPECIFIED TYPE: ICD-10-CM

## 2023-04-12 LAB
ALBUMIN SERPL-MCNC: 4.6 G/DL (ref 3.5–5.2)
ALBUMIN/GLOB SERPL: 1.8 G/DL
ALP SERPL-CCNC: 85 U/L (ref 39–117)
ALT SERPL W P-5'-P-CCNC: 12 U/L (ref 1–33)
ANION GAP SERPL CALCULATED.3IONS-SCNC: 13.2 MMOL/L (ref 5–15)
AST SERPL-CCNC: 23 U/L (ref 1–32)
BASOPHILS # BLD AUTO: 0.02 10*3/MM3 (ref 0–0.2)
BASOPHILS NFR BLD AUTO: 0.4 % (ref 0–1.5)
BILIRUB BLD-MCNC: NEGATIVE MG/DL
BILIRUB SERPL-MCNC: 0.4 MG/DL (ref 0–1.2)
BUN SERPL-MCNC: 25 MG/DL (ref 6–20)
BUN/CREAT SERPL: 18.2 (ref 7–25)
CALCIUM SPEC-SCNC: 10.1 MG/DL (ref 8.6–10.5)
CHLORIDE SERPL-SCNC: 100 MMOL/L (ref 98–107)
CHOLEST SERPL-MCNC: 206 MG/DL (ref 0–200)
CLARITY, POC: CLEAR
CO2 SERPL-SCNC: 27.8 MMOL/L (ref 22–29)
COLOR UR: ABNORMAL
CREAT SERPL-MCNC: 1.37 MG/DL (ref 0.57–1)
DEPRECATED RDW RBC AUTO: 40.5 FL (ref 37–54)
EGFRCR SERPLBLD CKD-EPI 2021: 46.3 ML/MIN/1.73
EOSINOPHIL # BLD AUTO: 0.28 10*3/MM3 (ref 0–0.4)
EOSINOPHIL NFR BLD AUTO: 5.2 % (ref 0.3–6.2)
ERYTHROCYTE [DISTWIDTH] IN BLOOD BY AUTOMATED COUNT: 12.9 % (ref 12.3–15.4)
EXPIRATION DATE: ABNORMAL
GLOBULIN UR ELPH-MCNC: 2.6 GM/DL
GLUCOSE SERPL-MCNC: 97 MG/DL (ref 65–99)
GLUCOSE UR STRIP-MCNC: NEGATIVE MG/DL
HCT VFR BLD AUTO: 37.4 % (ref 34–46.6)
HDLC SERPL-MCNC: 87 MG/DL (ref 40–60)
HGB BLD-MCNC: 12.9 G/DL (ref 12–15.9)
IMM GRANULOCYTES # BLD AUTO: 0.02 10*3/MM3 (ref 0–0.05)
IMM GRANULOCYTES NFR BLD AUTO: 0.4 % (ref 0–0.5)
KETONES UR QL: NEGATIVE
LDLC SERPL CALC-MCNC: 99 MG/DL (ref 0–100)
LDLC/HDLC SERPL: 1.1 {RATIO}
LEUKOCYTE EST, POC: ABNORMAL
LYMPHOCYTES # BLD AUTO: 1.42 10*3/MM3 (ref 0.7–3.1)
LYMPHOCYTES NFR BLD AUTO: 26.3 % (ref 19.6–45.3)
Lab: ABNORMAL
MCH RBC QN AUTO: 29.9 PG (ref 26.6–33)
MCHC RBC AUTO-ENTMCNC: 34.5 G/DL (ref 31.5–35.7)
MCV RBC AUTO: 86.8 FL (ref 79–97)
MONOCYTES # BLD AUTO: 0.65 10*3/MM3 (ref 0.1–0.9)
MONOCYTES NFR BLD AUTO: 12 % (ref 5–12)
NEUTROPHILS NFR BLD AUTO: 3.01 10*3/MM3 (ref 1.7–7)
NEUTROPHILS NFR BLD AUTO: 55.7 % (ref 42.7–76)
NITRITE UR-MCNC: NEGATIVE MG/ML
NRBC BLD AUTO-RTO: 0 /100 WBC (ref 0–0.2)
PH UR: 5 [PH] (ref 5–8)
PLATELET # BLD AUTO: 202 10*3/MM3 (ref 140–450)
PMV BLD AUTO: 10.8 FL (ref 6–12)
POTASSIUM SERPL-SCNC: 4.5 MMOL/L (ref 3.5–5.2)
PROT SERPL-MCNC: 7.2 G/DL (ref 6–8.5)
PROT UR STRIP-MCNC: NEGATIVE MG/DL
RBC # BLD AUTO: 4.31 10*6/MM3 (ref 3.77–5.28)
RBC # UR STRIP: NEGATIVE /UL
SODIUM SERPL-SCNC: 141 MMOL/L (ref 136–145)
SP GR UR: 1.02 (ref 1–1.03)
T4 FREE SERPL-MCNC: 1.41 NG/DL (ref 0.93–1.7)
TRIGL SERPL-MCNC: 117 MG/DL (ref 0–150)
TSH SERPL DL<=0.05 MIU/L-ACNC: 1.78 UIU/ML (ref 0.27–4.2)
UROBILINOGEN UR QL: ABNORMAL
VLDLC SERPL-MCNC: 20 MG/DL (ref 5–40)
WBC NRBC COR # BLD: 5.4 10*3/MM3 (ref 3.4–10.8)

## 2023-04-12 PROCEDURE — 80053 COMPREHEN METABOLIC PANEL: CPT | Performed by: INTERNAL MEDICINE

## 2023-04-12 PROCEDURE — 84439 ASSAY OF FREE THYROXINE: CPT | Performed by: INTERNAL MEDICINE

## 2023-04-12 PROCEDURE — 85025 COMPLETE CBC W/AUTO DIFF WBC: CPT | Performed by: INTERNAL MEDICINE

## 2023-04-12 PROCEDURE — 93000 ELECTROCARDIOGRAM COMPLETE: CPT | Performed by: INTERNAL MEDICINE

## 2023-04-12 PROCEDURE — 87086 URINE CULTURE/COLONY COUNT: CPT | Performed by: INTERNAL MEDICINE

## 2023-04-12 PROCEDURE — 84443 ASSAY THYROID STIM HORMONE: CPT | Performed by: INTERNAL MEDICINE

## 2023-04-12 PROCEDURE — 80061 LIPID PANEL: CPT | Performed by: INTERNAL MEDICINE

## 2023-04-12 RX ORDER — TRIAMTERENE AND HYDROCHLOROTHIAZIDE 37.5; 25 MG/1; MG/1
1 CAPSULE ORAL EVERY MORNING
Qty: 30 CAPSULE | Refills: 5 | Status: SHIPPED | OUTPATIENT
Start: 2023-04-12

## 2023-04-12 RX ORDER — ONDANSETRON 4 MG/1
4 TABLET, ORALLY DISINTEGRATING ORAL EVERY 8 HOURS PRN
Qty: 30 TABLET | Refills: 2 | Status: SHIPPED | OUTPATIENT
Start: 2023-04-12

## 2023-04-12 RX ORDER — ONDANSETRON 4 MG/1
4 TABLET, FILM COATED ORAL EVERY 8 HOURS PRN
COMMUNITY
End: 2023-04-12

## 2023-04-12 NOTE — PROGRESS NOTES
Chief Complaint   Patient presents with   • Chest Pain     X2 weeks    • Dizziness   • Palpitations     X1 day       History of Present Illness    The patient presents for an acute visit for intermittent chest pain. There is a one month history of chest pain. The episodes tend to last thirty minutes. The patient has noted no alleviating factors. The pain onset was sudden. The pain is made worse with activity and it is relieved with rest. The pain is not made worse with deep breaths. It is characterized as squeezing. The pain does not radiate. The patient states the pain is substernal. The pain is associated with palpitations and dizziness but is not associated with belching, clamminess, diaphoresis, fatigue, fever, indigestion, lightheadedness, nausea, shortness of breath or syncope. A past history of CAD is not reported.    The patient presents with a 1 day history of palpitations. There is associated chest pain. She denies syncope associated with the palpitations. The patient reports associated dizziness. The dizziness is primarily described as a sensation of lightheadedness. She denies ear pain, edema, headaches, focal neurologic deficits or tremors. There is no history of using over the counter cold medications, diet medications, illicit drugs, thyroid medications or excessive caffeine.    The patient presents for a follow-up related to hypertension. The patient reports that she has had no blurred vision. She states that she is taking her medication as prescribed. She is not having medication side effects.    The patient presents for an acute visit for a several month history of symptoms of diarrhea but she has not had nausea or vomiting. The diarrhea is watery. There is no blood in the bowel movement. She denies recent travel, the possibility of eating undercooked poultry, the possibility of eating undercooked hamburger, the possibility of eating spoiled seafood or recently eating at a restaurant. The symptoms  are associated with abdominal pain. The pain is described as crampy. The pain is diffuse. The abdominal pain does not radiate. The symptoms are not associated with fever. She does not have other symptoms, including a dry cough, a wet cough, wheezing, facial pain, eye drainage, ear drainage, myalgias, a sore throat, nasal discharge, decreased appetite, chills, a dry mouth or a rash. The patient has not tried anything for treatment of this illness.    Medications      Current Outpatient Medications:   •  acetaminophen (TYLENOL) 500 MG tablet, Take 1 tablet by mouth Every 4 (Four) Hours As Needed for Mild Pain  or Fever., Disp: 20 tablet, Rfl: 0  •  amLODIPine (NORVASC) 5 MG tablet, Take 1 tablet by mouth Daily., Disp: 30 tablet, Rfl: 1  •  hydroCHLOROthiazide (HYDRODIURIL) 25 MG tablet, TAKE 1 TABLET BY MOUTH DAILY, Disp: 30 tablet, Rfl: 1  •  ondansetron (ZOFRAN) 4 MG tablet, Take 1 tablet by mouth Every 8 (Eight) Hours As Needed for Nausea or Vomiting., Disp: , Rfl:      Allergies    Allergies   Allergen Reactions   • Penicillins Unknown - Low Severity       Problem List    There is no problem list on file for this patient.      Medications, Allergies, Problems List and Past History were reviewed and updated.    Physical Examination    /86 (BP Location: Left arm, Patient Position: Sitting, Cuff Size: Adult)   Pulse 90   Temp 97.5 °F (36.4 °C) (Infrared)   Resp 20   Wt 72.6 kg (160 lb)   BMI 27.90 kg/m²       HEENT: Head- Normocephalic Atraumatic. Facies- Within normal limits. Pinnas- Normal texture and shape bilaterally. Canals- Normal bilaterally. TMs- Normal bilaterally. Nares- Patent bilaterally. Nasal Septum- is normal. There is no tenderness to palpation over the frontal or maxillary sinuses. Lids- Normal bilaterally. Conjunctiva- Clear bilaterally. Sclera- Anicteric bilaterally. Oropharynx- Moist with no lesions. Tonsils- No enlargement, erythema or exudate.    Neck: Thyroid- non enlarged, symmetric  and has no nodules. No bruits are detected. ROM- Normal Range of Motion with no rigidity.    Lungs: Auscultation- Clear to auscultation bilaterally. There are no retractions, clubbing or cyanosis. The Expiratory to Inspiratory ratio is equal.    Lymph Nodes: Cervical- no enlarged lymph nodes noted.    Cardiovascular: There are no carotid bruits. Heart- Normal Rate with Regular rhythm and no murmurs. There are no gallops. There are no rubs. In the lower extremities there is no edema. The upper extremities do not have edema.    Abdomen: Soft, benign, non-tender with no masses, hernias, organomegaly or scars.      ECG 12 Lead    Date/Time: 4/12/2023 10:05 AM  Performed by: Abdullahi Petersen MD  Authorized by: Abdullahi Petersen MD   Comparison: compared with previous ECG from 5/24/2022  Similar to previous ECG  Rhythm: sinus rhythm  Ectopy: unifocal PVCs  Rate: normal  Conduction: conduction normal  ST Segments: ST segments normal  T Waves: T waves normal  QRS axis: normal  Other: no other findings    Clinical impression: non-specific ECG            Impression and Assessment    Chest Pain.    Palpitations.    Essential Hypertension.    Diarrhea.    Plan    Diarrhea Plan: Further plans will be made after the tests are reviewed.    Chest Pain Plan: Further plans will be made following the receipt of the test results.    Palpitations Plan: Further plans will be made following the receipt of the test results.    Essential Hypertension Plan: The medications were adjusted as noted.    Diagnoses and all orders for this visit:    1. Essential hypertension (Primary)  -     ECG 12 Lead  -     CBC & Differential; Future  -     Comprehensive Metabolic Panel; Future  -     T4, Free; Future  -     TSH; Future  -     triamterene-hydrochlorothiazide (Dyazide) 37.5-25 MG per capsule; Take 1 capsule by mouth Every Morning.  Dispense: 30 capsule; Refill: 5    2. Palpitations  -     ECG 12 Lead  -     CBC & Differential; Future  -      Comprehensive Metabolic Panel; Future  -     T4, Free; Future  -     TSH; Future  -     Adult Transthoracic Echo Complete W/ Cont if Necessary Per Protocol; Future  -     Stress Test With Myocardial Perfusion One Day; Future  -     Holter Monitor - 72 Hour Up To 15 Days; Future    3. Chest pain, unspecified type  -     CBC & Differential; Future  -     Lipid Panel; Future  -     Adult Transthoracic Echo Complete W/ Cont if Necessary Per Protocol; Future  -     Stress Test With Myocardial Perfusion One Day; Future  -     Holter Monitor - 72 Hour Up To 15 Days; Future    4. Diarrhea, unspecified type    Other orders  -     ondansetron ODT (ZOFRAN-ODT) 4 MG disintegrating tablet; Place 1 tablet on the tongue Every 8 (Eight) Hours As Needed for Nausea or Vomiting.  Dispense: 30 tablet; Refill: 2        Return to Office    The patient was instructed to return for follow-up in 1 month. The patient was instructed to return sooner if the condition changes, worsens, or does not resolve.

## 2023-04-13 LAB — BACTERIA SPEC AEROBE CULT: NORMAL

## 2023-07-03 ENCOUNTER — TELEPHONE (OUTPATIENT)
Dept: INTERNAL MEDICINE | Facility: CLINIC | Age: 53
End: 2023-07-03

## 2023-07-03 NOTE — TELEPHONE ENCOUNTER
I left a voice mail for patient at [phone] to please call our scheduling department at [phone] to reschedule the stress test and echocardiogram appointments that were cancelled  Voice mail ok per  Verbal    HUB-ok to tell her to please call our scheduling department at [phone] to reschedule the stress test and echocardiogram appointments that were cancelled

## 2023-07-18 NOTE — TELEPHONE ENCOUNTER
I left a voice mail for patient at [phone] to please call our scheduling department at [phone] to reschedule the stress test and echocardiogram appointments that were cancelled  Voice mail ok per  Verbal     HUB-ok to tell her to please call our scheduling department at [phone] to reschedule the stress test and echocardiogram appointments that were cancell

## 2023-08-01 NOTE — TELEPHONE ENCOUNTER
I left a voice mail for patient at [phone] to please call our scheduling department at [phone] to reschedule the stress test and echocardiogram appointments that were cancelled  Voice mail ok per  Verbal     HUB-ok to tell her to please call our scheduling department at [phone] to reschedule the stress test and echocardiogram appointments that were cancelled

## 2023-08-15 NOTE — TELEPHONE ENCOUNTER
Patient cancelled stress test and echo-I have mailed a letter and called 3x since then.  Is it ok to cancel both orders?

## 2023-11-30 DIAGNOSIS — I10 ESSENTIAL HYPERTENSION: ICD-10-CM

## 2023-11-30 RX ORDER — TRIAMTERENE AND HYDROCHLOROTHIAZIDE 37.5; 25 MG/1; MG/1
1 CAPSULE ORAL EVERY MORNING
Qty: 30 CAPSULE | Refills: 5 | Status: SHIPPED | OUTPATIENT
Start: 2023-11-30

## 2024-07-06 DIAGNOSIS — I10 ESSENTIAL HYPERTENSION: ICD-10-CM

## 2024-07-08 NOTE — TELEPHONE ENCOUNTER
LOV 04/12/2023  NOV     Tried to reach patient no answer left voicemail to return call    RELAY:  We recently received your message to refill your medication(s).  Our records indicate that you did not schedule a follow up appointment with your provider at your last visit on 04/12/2023. The medication that you are on requires a follow up appointment for additional refills. Patient was to schedule on or around 05/12/2023 for 1 month follow up also due for physical    If she is unable to keep her appointment we will not be able to provider further refills.  Once appointment has been scheduled please update message with date and time so we can process the request.  We will forward the message to the provider to review the refill request.

## 2024-07-10 RX ORDER — TRIAMTERENE AND HYDROCHLOROTHIAZIDE 37.5; 25 MG/1; MG/1
1 CAPSULE ORAL EVERY MORNING
Qty: 30 CAPSULE | Refills: 5 | Status: SHIPPED | OUTPATIENT
Start: 2024-07-10

## 2024-09-17 ENCOUNTER — TELEPHONE (OUTPATIENT)
Dept: INTERNAL MEDICINE | Facility: CLINIC | Age: 54
End: 2024-09-17
Payer: COMMERCIAL

## 2024-09-18 RX ORDER — AMLODIPINE BESYLATE 5 MG/1
5 TABLET ORAL DAILY
Qty: 90 TABLET | OUTPATIENT
Start: 2024-09-18

## 2024-09-18 RX ORDER — AMLODIPINE BESYLATE 5 MG/1
5 TABLET ORAL DAILY
Qty: 30 TABLET | Refills: 1 | Status: SHIPPED | OUTPATIENT
Start: 2024-09-18 | End: 2024-09-20

## 2024-09-20 ENCOUNTER — OFFICE VISIT (OUTPATIENT)
Dept: INTERNAL MEDICINE | Facility: CLINIC | Age: 54
End: 2024-09-20
Payer: COMMERCIAL

## 2024-09-20 VITALS
HEART RATE: 84 BPM | WEIGHT: 168 LBS | HEIGHT: 64 IN | SYSTOLIC BLOOD PRESSURE: 104 MMHG | TEMPERATURE: 97.9 F | DIASTOLIC BLOOD PRESSURE: 76 MMHG | BODY MASS INDEX: 28.68 KG/M2 | RESPIRATION RATE: 16 BRPM

## 2024-09-20 DIAGNOSIS — M25.511 CHRONIC RIGHT SHOULDER PAIN: ICD-10-CM

## 2024-09-20 DIAGNOSIS — G89.29 CHRONIC RIGHT SHOULDER PAIN: ICD-10-CM

## 2024-09-20 DIAGNOSIS — I10 ESSENTIAL HYPERTENSION: Primary | ICD-10-CM

## 2024-09-20 DIAGNOSIS — Z00.00 HEALTH CARE MAINTENANCE: ICD-10-CM

## 2024-09-20 DIAGNOSIS — Z11.3 SCREENING EXAMINATION FOR STI: ICD-10-CM

## 2024-09-20 LAB
ALBUMIN SERPL-MCNC: 4.5 G/DL (ref 3.5–5.2)
ALBUMIN/GLOB SERPL: 1.7 G/DL
ALP SERPL-CCNC: 96 U/L (ref 39–117)
ALT SERPL W P-5'-P-CCNC: 10 U/L (ref 1–33)
ANION GAP SERPL CALCULATED.3IONS-SCNC: 14 MMOL/L (ref 5–15)
AST SERPL-CCNC: 14 U/L (ref 1–32)
BASOPHILS # BLD AUTO: 0.03 10*3/MM3 (ref 0–0.2)
BASOPHILS NFR BLD AUTO: 0.5 % (ref 0–1.5)
BILIRUB SERPL-MCNC: 0.2 MG/DL (ref 0–1.2)
BUN SERPL-MCNC: 43 MG/DL (ref 6–20)
BUN/CREAT SERPL: 42.6 (ref 7–25)
CALCIUM SPEC-SCNC: 9.9 MG/DL (ref 8.6–10.5)
CHLORIDE SERPL-SCNC: 104 MMOL/L (ref 98–107)
CHOLEST SERPL-MCNC: 211 MG/DL (ref 0–200)
CO2 SERPL-SCNC: 26 MMOL/L (ref 22–29)
CREAT SERPL-MCNC: 1.01 MG/DL (ref 0.57–1)
DEPRECATED RDW RBC AUTO: 40.5 FL (ref 37–54)
EGFRCR SERPLBLD CKD-EPI 2021: 65.9 ML/MIN/1.73
EOSINOPHIL # BLD AUTO: 0.16 10*3/MM3 (ref 0–0.4)
EOSINOPHIL NFR BLD AUTO: 2.7 % (ref 0.3–6.2)
ERYTHROCYTE [DISTWIDTH] IN BLOOD BY AUTOMATED COUNT: 13 % (ref 12.3–15.4)
GLOBULIN UR ELPH-MCNC: 2.7 GM/DL
GLUCOSE SERPL-MCNC: 105 MG/DL (ref 65–99)
HBV SURFACE AG SERPL QL IA: NORMAL
HCT VFR BLD AUTO: 36.2 % (ref 34–46.6)
HDLC SERPL-MCNC: 75 MG/DL (ref 40–60)
HGB BLD-MCNC: 11.9 G/DL (ref 12–15.9)
IMM GRANULOCYTES # BLD AUTO: 0.03 10*3/MM3 (ref 0–0.05)
IMM GRANULOCYTES NFR BLD AUTO: 0.5 % (ref 0–0.5)
LDLC SERPL CALC-MCNC: 110 MG/DL (ref 0–100)
LDLC/HDLC SERPL: 1.41 {RATIO}
LYMPHOCYTES # BLD AUTO: 1.34 10*3/MM3 (ref 0.7–3.1)
LYMPHOCYTES NFR BLD AUTO: 22.6 % (ref 19.6–45.3)
MCH RBC QN AUTO: 28.5 PG (ref 26.6–33)
MCHC RBC AUTO-ENTMCNC: 32.9 G/DL (ref 31.5–35.7)
MCV RBC AUTO: 86.6 FL (ref 79–97)
MONOCYTES # BLD AUTO: 0.48 10*3/MM3 (ref 0.1–0.9)
MONOCYTES NFR BLD AUTO: 8.1 % (ref 5–12)
NEUTROPHILS NFR BLD AUTO: 3.88 10*3/MM3 (ref 1.7–7)
NEUTROPHILS NFR BLD AUTO: 65.6 % (ref 42.7–76)
NRBC BLD AUTO-RTO: 0 /100 WBC (ref 0–0.2)
PLATELET # BLD AUTO: 237 10*3/MM3 (ref 140–450)
PMV BLD AUTO: 11.8 FL (ref 6–12)
POTASSIUM SERPL-SCNC: 3.8 MMOL/L (ref 3.5–5.2)
PROT SERPL-MCNC: 7.2 G/DL (ref 6–8.5)
RBC # BLD AUTO: 4.18 10*6/MM3 (ref 3.77–5.28)
SODIUM SERPL-SCNC: 144 MMOL/L (ref 136–145)
T4 FREE SERPL-MCNC: 1.18 NG/DL (ref 0.92–1.68)
TRIGL SERPL-MCNC: 152 MG/DL (ref 0–150)
TSH SERPL DL<=0.05 MIU/L-ACNC: 1.38 UIU/ML (ref 0.27–4.2)
VLDLC SERPL-MCNC: 26 MG/DL (ref 5–40)
WBC NRBC COR # BLD AUTO: 5.92 10*3/MM3 (ref 3.4–10.8)

## 2024-09-20 PROCEDURE — 87801 DETECT AGNT MULT DNA AMPLI: CPT | Performed by: PHYSICIAN ASSISTANT

## 2024-09-20 PROCEDURE — 87661 TRICHOMONAS VAGINALIS AMPLIF: CPT | Performed by: PHYSICIAN ASSISTANT

## 2024-09-20 PROCEDURE — 84443 ASSAY THYROID STIM HORMONE: CPT | Performed by: PHYSICIAN ASSISTANT

## 2024-09-20 PROCEDURE — 99214 OFFICE O/P EST MOD 30 MIN: CPT | Performed by: PHYSICIAN ASSISTANT

## 2024-09-20 PROCEDURE — 87798 DETECT AGENT NOS DNA AMP: CPT | Performed by: PHYSICIAN ASSISTANT

## 2024-09-20 PROCEDURE — 36415 COLL VENOUS BLD VENIPUNCTURE: CPT | Performed by: PHYSICIAN ASSISTANT

## 2024-09-20 PROCEDURE — 86696 HERPES SIMPLEX TYPE 2 TEST: CPT | Performed by: PHYSICIAN ASSISTANT

## 2024-09-20 PROCEDURE — 80053 COMPREHEN METABOLIC PANEL: CPT | Performed by: PHYSICIAN ASSISTANT

## 2024-09-20 PROCEDURE — 87591 N.GONORRHOEAE DNA AMP PROB: CPT | Performed by: PHYSICIAN ASSISTANT

## 2024-09-20 PROCEDURE — 86592 SYPHILIS TEST NON-TREP QUAL: CPT | Performed by: PHYSICIAN ASSISTANT

## 2024-09-20 PROCEDURE — 85025 COMPLETE CBC W/AUTO DIFF WBC: CPT | Performed by: PHYSICIAN ASSISTANT

## 2024-09-20 PROCEDURE — 86708 HEPATITIS A ANTIBODY: CPT | Performed by: PHYSICIAN ASSISTANT

## 2024-09-20 PROCEDURE — G0432 EIA HIV-1/HIV-2 SCREEN: HCPCS | Performed by: PHYSICIAN ASSISTANT

## 2024-09-20 PROCEDURE — 80061 LIPID PANEL: CPT | Performed by: PHYSICIAN ASSISTANT

## 2024-09-20 PROCEDURE — 86704 HEP B CORE ANTIBODY TOTAL: CPT | Performed by: PHYSICIAN ASSISTANT

## 2024-09-20 PROCEDURE — 86803 HEPATITIS C AB TEST: CPT | Performed by: PHYSICIAN ASSISTANT

## 2024-09-20 PROCEDURE — 83036 HEMOGLOBIN GLYCOSYLATED A1C: CPT | Performed by: PHYSICIAN ASSISTANT

## 2024-09-20 PROCEDURE — 86695 HERPES SIMPLEX TYPE 1 TEST: CPT | Performed by: PHYSICIAN ASSISTANT

## 2024-09-20 PROCEDURE — 84439 ASSAY OF FREE THYROXINE: CPT | Performed by: PHYSICIAN ASSISTANT

## 2024-09-20 PROCEDURE — 87491 CHLMYD TRACH DNA AMP PROBE: CPT | Performed by: PHYSICIAN ASSISTANT

## 2024-09-20 PROCEDURE — 87340 HEPATITIS B SURFACE AG IA: CPT | Performed by: PHYSICIAN ASSISTANT

## 2024-09-20 RX ORDER — CYANOCOBALAMIN (VITAMIN B-12) 2500 MCG
2500 TABLET, SUBLINGUAL SUBLINGUAL DAILY
COMMUNITY

## 2024-09-20 RX ORDER — MELOXICAM 7.5 MG/1
7.5 TABLET ORAL DAILY
Qty: 30 TABLET | Refills: 3 | Status: SHIPPED | OUTPATIENT
Start: 2024-09-20

## 2024-09-20 RX ORDER — ASCORBIC ACID 125 MG
1 TABLET,CHEWABLE ORAL DAILY
COMMUNITY

## 2024-09-21 LAB
HBA1C MFR BLD: 5.6 % (ref 4.8–5.6)
HCV AB SER QL: NORMAL
HIV 1+2 AB+HIV1 P24 AG SERPL QL IA: NORMAL
RPR SER QL: NORMAL

## 2024-09-22 LAB
HAV AB SER QL IA: POSITIVE
HBV CORE AB SERPL QL IA: NEGATIVE
HSV1 IGG SER IA-ACNC: 32.5 INDEX (ref 0–0.9)
HSV2 IGG SER IA-ACNC: 5.86 INDEX (ref 0–0.9)

## 2024-09-24 LAB
A VAGINAE DNA VAG QL NAA+PROBE: NORMAL SCORE
BVAB2 DNA VAG QL NAA+PROBE: NORMAL SCORE
C ALBICANS DNA VAG QL NAA+PROBE: NEGATIVE
C GLABRATA DNA VAG QL NAA+PROBE: NEGATIVE
C TRACH DNA SPEC QL NAA+PROBE: NEGATIVE
MEGA1 DNA VAG QL NAA+PROBE: NORMAL SCORE
N GONORRHOEA DNA VAG QL NAA+PROBE: NEGATIVE
T VAGINALIS DNA VAG QL NAA+PROBE: NEGATIVE

## 2024-10-17 ENCOUNTER — OFFICE VISIT (OUTPATIENT)
Dept: INTERNAL MEDICINE | Facility: CLINIC | Age: 54
End: 2024-10-17
Payer: COMMERCIAL

## 2024-10-17 ENCOUNTER — TELEPHONE (OUTPATIENT)
Dept: INTERNAL MEDICINE | Facility: CLINIC | Age: 54
End: 2024-10-17
Payer: COMMERCIAL

## 2024-10-17 VITALS
TEMPERATURE: 97.8 F | OXYGEN SATURATION: 97 % | SYSTOLIC BLOOD PRESSURE: 130 MMHG | HEART RATE: 85 BPM | RESPIRATION RATE: 20 BRPM | DIASTOLIC BLOOD PRESSURE: 70 MMHG | BODY MASS INDEX: 29.42 KG/M2 | WEIGHT: 171.38 LBS

## 2024-10-17 DIAGNOSIS — I10 ESSENTIAL HYPERTENSION: ICD-10-CM

## 2024-10-17 PROCEDURE — 99213 OFFICE O/P EST LOW 20 MIN: CPT

## 2024-10-17 RX ORDER — HYDROCHLOROTHIAZIDE 50 MG/1
50 TABLET ORAL DAILY
Qty: 90 TABLET | Refills: 0 | Status: SHIPPED | OUTPATIENT
Start: 2024-10-17

## 2024-10-17 RX ORDER — AMLODIPINE BESYLATE 5 MG/1
TABLET ORAL
COMMUNITY
Start: 2024-10-15 | End: 2024-10-17

## 2024-10-17 RX ORDER — ONDANSETRON 4 MG/1
4 TABLET, ORALLY DISINTEGRATING ORAL EVERY 8 HOURS PRN
Qty: 60 TABLET | Refills: 2 | Status: SHIPPED | OUTPATIENT
Start: 2024-10-17

## 2024-10-17 NOTE — PROGRESS NOTES
"Chief Complaint  Hypertension and Dizziness    Subjective          Sumalilian Quijano presents to [facility]  History of Present Illness  Patient is here for evaluation of her blood pressure. She stats she has been having some fluctuations in her blood pressure with some occasional dizziness since Tuesday. She was seen in September for similar issue but no medication changes were made. She is currently on triamterene-HCTZ 37.5- 25 mg daily as well as amlodipine 5 mg daily. She states her blood pressures are overall normal at home staying consistently in the 110's/80's. Today she had a reading at work that was in the 150's which caused some dizziness.  /70 in office today. She states she did feel a little \"flutter\" in her chest when it was elevated at work but has since resolved. She denies any chest pain, shortness of breath or headaches. She is concerned about swelling in her thighs as she feels like she keeps gaining weight despite eating healthy and cutting out soda/sweets. She is drinking a lot of water.     She thinks maybe her elevated blood pressure is due to some anxiety. 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 She states she was doing well when she was just on hydrochlorothiazide 25 mg in the past and wasn't having any difficulty with swelling or with fluctuations in her blood pressure. She would like to see if other options are available for medications.    She is requesting a refill of her Zofran to have on hand as she has been having some difficulty with her pharmacy lately.    Objective   Vital Signs:   /70 (BP Location: Left arm, Patient Position: Sitting, Cuff Size: Adult)   Pulse 85   Temp 97.8 °F (36.6 °C) (Temporal)   Resp 20   Wt 77.7 kg (171 lb 6 oz)   SpO2 97%   BMI 29.42 kg/m²     Body mass index is 29.42 kg/m².    Review of Systems   Respiratory:  Negative for chest tightness " and shortness of breath.    Cardiovascular:  Positive for leg swelling (patient reports thighs are swollen, no BLE edema). Negative for chest pain.   Neurological:  Positive for dizziness. Negative for syncope and headache.       Past History:  Medical History: has a past medical history of Angina at rest, Anxiety, Depression, Hypertension, and Scoliosis (1990).   Surgical History: has a past surgical history that includes  section (2002).   Family History: family history includes Alcohol abuse in her maternal grandfather and maternal grandmother; Anxiety disorder in her brother, father, and mother; Arthritis in her father and mother; Breast cancer in her paternal aunt; COPD in her maternal grandfather; Cancer in her mother; Depression in her father; Diabetes in her father; Early death in her father and mother; Heart attack in her father; Heart disease in her brother and father; Hyperlipidemia in her father and mother; Hypertension in her brother, father, and mother; Lung cancer in her mother; Mental illness in her father; Ovarian cancer in her mother; Thyroid disease in her brother.   Social History: reports that she has never smoked. She has never been exposed to tobacco smoke. She has never used smokeless tobacco. She reports current alcohol use of about 3.0 standard drinks of alcohol per week. She reports that she does not use drugs.      Current Outpatient Medications:     Magnesium Oxide, Antacid, 500 MG capsule, Take 1 tablet by mouth Daily., Disp: , Rfl:     meloxicam (Mobic) 7.5 MG tablet, Take 1 tablet by mouth Daily., Disp: 30 tablet, Rfl: 3    ondansetron ODT (ZOFRAN-ODT) 4 MG disintegrating tablet, Place 1 tablet on the tongue Every 8 (Eight) Hours As Needed for Nausea or Vomiting., Disp: 60 tablet, Rfl: 2    vitamin B-12 (CYANOCOBALAMIN) 2500 MCG sublingual tablet tablet, Place 2,500 mcg under the tongue Daily., Disp: , Rfl:     hydroCHLOROthiazide 50 MG tablet, Take 1 tablet by mouth  Daily., Disp: 90 tablet, Rfl: 0    Allergies: Penicillins    Physical Exam  Constitutional:       General: She is not in acute distress.     Appearance: She is not ill-appearing or toxic-appearing.   HENT:      Head: Normocephalic and atraumatic.   Cardiovascular:      Rate and Rhythm: Normal rate and regular rhythm.      Pulses: Normal pulses.      Heart sounds: Normal heart sounds. No murmur heard.  Pulmonary:      Effort: Pulmonary effort is normal. No respiratory distress.      Breath sounds: Normal breath sounds. No wheezing or rhonchi.   Musculoskeletal:      Right lower leg: No edema.      Left lower leg: No edema.      Comments: She feels like the weight and water has gone to her thighs. No BLE edema noted. No pitting edema.    Skin:     General: Skin is warm.   Neurological:      General: No focal deficit present.      Mental Status: She is alert and oriented to person, place, and time. Mental status is at baseline.   Psychiatric:         Mood and Affect: Mood normal.          Result Review :            Office Visit with Marie Crespo PA-C (09/20/2024)   Office Visit with Abdullahi Petersen MD (04/12/2023)    Comprehensive Metabolic Panel (09/20/2024 16:35)   Assessment and Plan    Assessment & Plan  Essential hypertension  We discussed multiple medication changes and options such as discontinuing the triamterene-HCTZ as a whole, increasing the dose of triamterene-HCTZ, discontinuing the amlodipine, starting HCTZ on it's own at 25 mg or starting HCTZ at 50 mg. We discussed that amlodipine can cause leg swelling so we will go ahead and discontinue that to see if that resolves any of her symptoms. Since her blood pressure is not consistently elevated and is mostly within the 110's/80's at home, we talked about avoiding introduction of any new blood pressure medications or diuretics at this time due to the risk of hypotension as her blood pressure is typically at goal at home on a routine basis. She  is understanding of this.     We discussed her dizziness could be due to her blood pressure spiking or bottoming out throughout the day with her current regimen. Advised her to take her blood pressure twice a day, morning and evening, with feet flat on the floor resting for 5 minutes prior to taking and to keep a log. We discussed that since the hydrochlorothiazide (HCTZ) has worked really well for her in the past, we will up the dose to 50 mg since she has tolerated 25 mg well. We discussed that this medication alone may be able to help with the increase of edema as well as controlling her blood pressure. Advised this drug class can cause some dizziness as well as electrolyte imbalances as it is a diuretic. Ideally, we do not want more stress on the kidneys so recommend close follow up with PCP Dr. Petersen in 2 weeks to evaluate kidney function and electrolytes, as well as effectiveness of medication with her blood pressure.     Advised if her blood pressure is consistently >130/80's given her baseline of 110's/80's to notify her PCP. Advised to seek medical attention at ED if any new or worsening symptoms or if she develops chest pain, shortness of breath, swelling of her lower extremities or worsening swelling, or headaches. We discussed finding the right regimen may take some time. She is understanding of this. Also recommend decreasing or limiting stress as best as she can and changing positions slowly to help with any dizziness. She is agreeable to plan and verbalized understanding of plan of care.      New Medications Ordered This Visit   Medications    hydroCHLOROthiazide 50 MG tablet     Sig: Take 1 tablet by mouth Daily.     Dispense:  90 tablet     Refill:  0    ondansetron ODT (ZOFRAN-ODT) 4 MG disintegrating tablet     Sig: Place 1 tablet on the tongue Every 8 (Eight) Hours As Needed for Nausea or Vomiting.     Dispense:  60 tablet     Refill:  2          Follow Up   Return for Recheck.  Patient was  given instructions and counseling regarding her condition or for health maintenance advice. Please see specific information pulled into the AVS if appropriate.     KAVON Odonnell

## 2024-10-17 NOTE — PATIENT INSTRUCTIONS
Stop the Amlodipine 5 mg.     Stop the Diazide. You will be starting hydrochlorothiazide 50 mg daily. You were on 25 mg in the past.     Recommend checking blood pressure in the morning and evening and bring log to appointment.

## 2024-10-17 NOTE — TELEPHONE ENCOUNTER
Patient called reporting dizziness, slight headache and elevated BP of 160/110. She stated this has been going on since 6:30 this morning.     Reported information to provider and scheduled patient for appointment with NP this afternoon.

## 2024-11-13 RX ORDER — AMLODIPINE BESYLATE 5 MG/1
5 TABLET ORAL DAILY
Qty: 90 TABLET | OUTPATIENT
Start: 2024-11-13

## 2024-12-14 DIAGNOSIS — I10 ESSENTIAL HYPERTENSION: ICD-10-CM

## 2024-12-16 RX ORDER — TRIAMTERENE AND HYDROCHLOROTHIAZIDE 37.5; 25 MG/1; MG/1
1 CAPSULE ORAL EVERY MORNING
Qty: 30 CAPSULE | Refills: 5 | Status: SHIPPED | OUTPATIENT
Start: 2024-12-16

## 2025-02-26 ENCOUNTER — TELEPHONE (OUTPATIENT)
Dept: INTERNAL MEDICINE | Facility: CLINIC | Age: 55
End: 2025-02-26
Payer: COMMERCIAL

## 2025-02-26 RX ORDER — BROMPHENIRAMINE MALEATE, PSEUDOEPHEDRINE HYDROCHLORIDE, AND DEXTROMETHORPHAN HYDROBROMIDE 2; 30; 10 MG/5ML; MG/5ML; MG/5ML
5 SYRUP ORAL 3 TIMES DAILY PRN
Qty: 118 ML | Refills: 0 | Status: SHIPPED | OUTPATIENT
Start: 2025-02-26

## 2025-02-26 RX ORDER — OSELTAMIVIR PHOSPHATE 75 MG/1
75 CAPSULE ORAL 2 TIMES DAILY
Qty: 10 CAPSULE | Refills: 0 | Status: SHIPPED | OUTPATIENT
Start: 2025-02-26 | End: 2025-03-03

## 2025-02-26 NOTE — TELEPHONE ENCOUNTER
Patient called with flu like symptoms. Would like to be seen. No appointments available. Please call patient to discuss.

## 2025-02-26 NOTE — TELEPHONE ENCOUNTER
Called patient.  She has flu A.  Aches, pains, runny nose fevers.  I have called in tamiflu and bromfed.    Please provide her a work excuse today through Sunday.    Abdullahi Petersen MD  09:48 EST  02/26/25

## 2025-04-22 ENCOUNTER — TELEPHONE (OUTPATIENT)
Dept: INTERNAL MEDICINE | Facility: CLINIC | Age: 55
End: 2025-04-22
Payer: COMMERCIAL

## 2025-04-22 NOTE — TELEPHONE ENCOUNTER
Caller: Suma Quijano    Relationship: Self    Best call back number: [phone]       What was the call regarding: PATIENT IS CALLING AGAIN TO SPEAK WITH DR. ALEGRE OR HIS NURSE. SHE IS UNABLE TO KEEP ANY FOOD DOWN DUE TO HER ANXIETY. 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

## 2025-04-22 NOTE — TELEPHONE ENCOUNTER
Spoke with patient, states she is having a very difficult time with stress and anxiety.  States she is very overwhelmed with her job and this is causing anxiety along with nausea and dizziness. Reports B/P 126/87. States started yesterday at work and that she had to call in today.  States she is needing time off from work to deal with this.  States she has not taken any medication for this for about 15 years and really didn't like the way it made her feel and that she cannot have any weight gain.  States she did work yesterday but not today and feels she needs off at least rest of this week. Explained that she needs to come in for appointment, states she really doesn't feel like coming in and doesn't want to unless Dr Petersen says she has too.  Explained will send info to Dr Petersen and let her know.

## 2025-04-23 RX ORDER — ONDANSETRON 4 MG/1
4 TABLET, ORALLY DISINTEGRATING ORAL EVERY 8 HOURS PRN
Qty: 60 TABLET | Refills: 2 | Status: SHIPPED | OUTPATIENT
Start: 2025-04-23

## 2025-04-23 NOTE — TELEPHONE ENCOUNTER
Patient is asking for nausea medicine ASAP to be able to come in and see provider for anxiety tomorrow. Medication can be sent to [facility]'s pharmacy on file. Requested call back.

## 2025-04-23 NOTE — TELEPHONE ENCOUNTER
Yes she will need an appt to discuss anxiety and methods of treatment.  OK to give off work for 3 days pending appointment with either me or extender.  Abdullahi Petersen MD  05:57 EDT  04/23/25

## 2025-04-23 NOTE — TELEPHONE ENCOUNTER
Patient called again. I have relayed provider's message and scheduled patient as recommended by provider.

## 2025-04-24 ENCOUNTER — OFFICE VISIT (OUTPATIENT)
Dept: INTERNAL MEDICINE | Facility: CLINIC | Age: 55
End: 2025-04-24
Payer: COMMERCIAL

## 2025-04-24 VITALS
HEIGHT: 64 IN | TEMPERATURE: 98 F | DIASTOLIC BLOOD PRESSURE: 74 MMHG | HEART RATE: 82 BPM | RESPIRATION RATE: 16 BRPM | BODY MASS INDEX: 29.81 KG/M2 | SYSTOLIC BLOOD PRESSURE: 118 MMHG | WEIGHT: 174.6 LBS

## 2025-04-24 DIAGNOSIS — F41.9 ANXIETY: Primary | ICD-10-CM

## 2025-04-24 PROCEDURE — 99213 OFFICE O/P EST LOW 20 MIN: CPT | Performed by: INTERNAL MEDICINE

## 2025-04-24 RX ORDER — CLONAZEPAM 0.5 MG/1
0.5 TABLET ORAL 2 TIMES DAILY PRN
Qty: 14 TABLET | Refills: 0 | Status: SHIPPED | OUTPATIENT
Start: 2025-04-24

## 2025-04-24 NOTE — PROGRESS NOTES
Chief Complaint   Patient presents with    Anxiety    Leg Pain     Left leg pain- pt reports she is unable to sleep on left side        History of Present Illness      The patient presents for an acute visit for anxiety. She is not on a medication for depression or anxiety. She denies suicidal ideation. The patient does not feel that she is a danger to others. Her energy level is fair. She reports agorophobia. She has trouble falling asleep and staying asleep. She is tearful.       She states that her moods are depressed. She does have panic attacks.       She states that she has never suffered from major depression, anxiety disorder, bipolar disorder or suicidal thoughts. She has not had prior hospitalizations for psychiatric illness. She is not under the current care of a psychiatrist.       She states that she does have increased stress including including job problems and loneliness.    Medications      Current Outpatient Medications:     brompheniramine-pseudoephedrine-DM 30-2-10 MG/5ML syrup, Take 5 mL by mouth 3 (Three) Times a Day As Needed for Congestion or Cough., Disp: 118 mL, Rfl: 0    Magnesium Oxide, Antacid, 500 MG capsule, Take 1 tablet by mouth Daily., Disp: , Rfl:     meloxicam (Mobic) 7.5 MG tablet, Take 1 tablet by mouth Daily., Disp: 30 tablet, Rfl: 3    ondansetron ODT (ZOFRAN-ODT) 4 MG disintegrating tablet, Place 1 tablet on the tongue Every 8 (Eight) Hours As Needed for Nausea or Vomiting., Disp: 60 tablet, Rfl: 2    triamterene-hydrochlorothiazide (DYAZIDE) 37.5-25 MG per capsule, TAKE 1 CAPSULE BY MOUTH EVERY MORNING, Disp: 30 capsule, Rfl: 5    vitamin B-12 (CYANOCOBALAMIN) 2500 MCG sublingual tablet tablet, Place 2,500 mcg under the tongue Daily., Disp: , Rfl:      Allergies    Allergies   Allergen Reactions    Penicillins Unknown - Low Severity       Problem List    There is no problem list on file for this patient.      Medications, Allergies, Problems List and Past History were  "reviewed and updated.    Physical Examination    /74 (BP Location: Left arm, Patient Position: Sitting, Cuff Size: Adult)   Pulse 82   Temp 98 °F (36.7 °C) (Infrared)   Resp 16   Ht 162.6 cm (64\")   Wt 79.2 kg (174 lb 9.6 oz)   BMI 29.97 kg/m²       Impression and Assessment    Anxiety Disorder.    Plan    Anxiety Disorder Plan: Medication will be added as noted.    Diagnoses and all orders for this visit:    1. Anxiety (Primary)  -     clonazePAM (KlonoPIN) 0.5 MG tablet; Take 1 tablet by mouth 2 (Two) Times a Day As Needed for Anxiety.  Dispense: 14 tablet; Refill: 0            Return to Office    The patient was instructed to return for follow-up in 10 days. The patient was instructed to return sooner if the condition changes, worsens, or does not resolve.  "

## 2025-04-25 ENCOUNTER — TELEPHONE (OUTPATIENT)
Dept: INTERNAL MEDICINE | Facility: CLINIC | Age: 55
End: 2025-04-25
Payer: COMMERCIAL

## 2025-04-25 NOTE — TELEPHONE ENCOUNTER
Patient called saying she is still not feeling well enough to return to work on 04/28 and is asking for approval for a letter that extends her time off to 05/05/25, returning her to work on 05/06/25.

## 2025-05-19 ENCOUNTER — TELEPHONE (OUTPATIENT)
Dept: INTERNAL MEDICINE | Facility: CLINIC | Age: 55
End: 2025-05-19
Payer: COMMERCIAL

## 2025-05-19 NOTE — TELEPHONE ENCOUNTER
Patient called requesting a note from work for 5/19 and 5/20/25. She has appointment Thursday with provider. Please advise if ok to give note.

## 2025-06-03 ENCOUNTER — OFFICE VISIT (OUTPATIENT)
Dept: INTERNAL MEDICINE | Facility: CLINIC | Age: 55
End: 2025-06-03
Payer: COMMERCIAL

## 2025-06-03 VITALS
DIASTOLIC BLOOD PRESSURE: 78 MMHG | SYSTOLIC BLOOD PRESSURE: 116 MMHG | WEIGHT: 175.6 LBS | RESPIRATION RATE: 12 BRPM | BODY MASS INDEX: 30.14 KG/M2 | TEMPERATURE: 98.2 F | HEART RATE: 64 BPM

## 2025-06-03 DIAGNOSIS — Z76.89 ENCOUNTER FOR WEIGHT MANAGEMENT: ICD-10-CM

## 2025-06-03 DIAGNOSIS — R11.0 NAUSEA: ICD-10-CM

## 2025-06-03 DIAGNOSIS — G47.00 INSOMNIA, UNSPECIFIED TYPE: ICD-10-CM

## 2025-06-03 DIAGNOSIS — F41.9 ANXIETY: Primary | ICD-10-CM

## 2025-06-03 PROCEDURE — 99214 OFFICE O/P EST MOD 30 MIN: CPT | Performed by: INTERNAL MEDICINE

## 2025-06-03 RX ORDER — FEXOFENADINE HCL 180 MG/1
180 TABLET ORAL DAILY
COMMUNITY
Start: 2023-08-15

## 2025-06-03 RX ORDER — PHENTERMINE HYDROCHLORIDE 37.5 MG/1
37.5 TABLET ORAL
Qty: 30 TABLET | Refills: 1 | Status: SHIPPED | OUTPATIENT
Start: 2025-06-03

## 2025-06-03 RX ORDER — ONDANSETRON 4 MG/1
4 TABLET, ORALLY DISINTEGRATING ORAL EVERY 8 HOURS PRN
Qty: 60 TABLET | Refills: 2 | Status: SHIPPED | OUTPATIENT
Start: 2025-06-03

## 2025-06-03 RX ORDER — TRAZODONE HYDROCHLORIDE 50 MG/1
50 TABLET ORAL NIGHTLY
Qty: 30 TABLET | Refills: 1 | Status: SHIPPED | OUTPATIENT
Start: 2025-06-03

## 2025-06-03 NOTE — PROGRESS NOTES
Chief Complaint   Patient presents with    Follow-up     Chronic medical conditions       History of Present Illness     The patient presents for a follow-up related to anxiety. She denies currently having anxiety symptoms. Denies having panic attacks. Her energy level is good.  States that her current anxiety symptoms are stable.     The patient presents for a follow-up related to insomnia. She denies feelings of depression and anxiety. There is no history of excessive caffeine usage. There is no history of nocturia. She has tried taking melatonin for her insomia. She states that she did not have relief with these medications. She denies snoring. She has difficulty falling asleep and staying asleep. She states she has taken valium in the past and it made her have hallucinations.    The patient presents for a follow-up related to hypertension. The patient reports that she has had no headaches, chest pain, dyspnea, edema, syncope, blurred vision or palpitations. She states that he is taking his medication as prescribed. She is not having medication side effects.     The patient complains of nausea that is intermittend. She states it is commonly due to nerves. She does admit to working a high stress job. She has been on zofran and takes as needed. States this resolved symptoms.    The patient presents with complaints of weight gain, she states she is only one meal a day. She reports exercising regularly and gets a lot of steps in at work. She states the weight gain has gotten worse with being post menopause.    Medications      Current Outpatient Medications:     fexofenadine (Allegra Allergy) 180 MG tablet, Take 1 tablet by mouth Daily., Disp: , Rfl:     Magnesium Oxide, Antacid, 500 MG capsule, Take 1 tablet by mouth Daily., Disp: , Rfl:     meloxicam (Mobic) 7.5 MG tablet, Take 1 tablet by mouth Daily., Disp: 30 tablet, Rfl: 3    ondansetron ODT (ZOFRAN-ODT) 4 MG disintegrating tablet, Place 1 tablet on the tongue  Every 8 (Eight) Hours As Needed for Nausea or Vomiting., Disp: 60 tablet, Rfl: 2    triamterene-hydrochlorothiazide (DYAZIDE) 37.5-25 MG per capsule, TAKE 1 CAPSULE BY MOUTH EVERY MORNING, Disp: 30 capsule, Rfl: 5    vitamin B-12 (CYANOCOBALAMIN) 2500 MCG sublingual tablet tablet, Place 2,500 mcg under the tongue Daily., Disp: , Rfl:     brompheniramine-pseudoephedrine-DM 30-2-10 MG/5ML syrup, Take 5 mL by mouth 3 (Three) Times a Day As Needed for Congestion or Cough. (Patient not taking: Reported on 6/3/2025), Disp: 118 mL, Rfl: 0     Allergies    Allergies   Allergen Reactions    Penicillins Unknown - Low Severity       Problem List    There is no problem list on file for this patient.      Medications, Allergies, Problems List and Past History were reviewed and updated.    Physical Examination    /78 (BP Location: Left arm, Patient Position: Sitting, Cuff Size: Adult)   Pulse 64   Temp 98.2 °F (36.8 °C) (Infrared)   Resp 12   Wt 79.7 kg (175 lb 9.6 oz)   BMI 30.14 kg/m²      BMI is >= 30 and <35. (Class 1 Obesity). The following options were offered after discussion;: exercise counseling/recommendations, nutrition counseling/recommendations, and pharmacological intervention options     Physical Exam  Constitutional:       General: MINA is not in acute distress.     Appearance: Normal appearance. MINA is not ill-appearing, toxic-appearing or diaphoretic.   Cardiovascular:      Rate and Rhythm: Normal rate and regular rhythm.      Pulses: Normal pulses.      Heart sounds: Normal heart sounds. No murmur heard.     No friction rub. No gallop.   Pulmonary:      Effort: Pulmonary effort is normal. No respiratory distress.      Breath sounds: Normal breath sounds. No stridor. No wheezing, rhonchi or rales.   Chest:      Chest wall: No tenderness.   Neurological:      Mental Status: MINA is alert.   Psychiatric:         Mood and Affect: Mood normal.         Behavior: Behavior normal.          Thought Content: Thought content normal.         Judgment: Judgment normal.      Diagnoses and all orders for this visit:    1. Anxiety (Primary)    2. Insomnia, unspecified type  -     traZODone (DESYREL) 50 MG tablet; Take 1 tablet by mouth Every Night.  Dispense: 30 tablet; Refill: 1    3. Encounter for weight management  -     phentermine (ADIPEX-P) 37.5 MG tablet; Take 1 tablet by mouth Every Morning Before Breakfast.  Dispense: 30 tablet; Refill: 1    4. Nausea  -     ondansetron ODT (ZOFRAN-ODT) 4 MG disintegrating tablet; Place 1 tablet on the tongue Every 8 (Eight) Hours As Needed for Nausea or Vomiting.  Dispense: 60 tablet; Refill: 2       Continue on prescribed medication regimen. Begin taking the above medications, discussed mechanism and side effects. The patient was instructed to return for follow-up in 4-6 weeks. The patient was instructed to return sooner if the condition changes, worsens, or does not resolve.       Attending Note:   Patient was personally seen and examined by me.  I have obtained and corroborated the history and examination as documented above, and agree with the accuracy of the documented information.  All orders were reviewed and personally signed by me.   Abdullahi Petersen MD  07:05 EDT  06/04/25

## 2025-06-04 ENCOUNTER — TELEPHONE (OUTPATIENT)
Dept: INTERNAL MEDICINE | Facility: CLINIC | Age: 55
End: 2025-06-04

## 2025-06-06 ENCOUNTER — TELEPHONE (OUTPATIENT)
Dept: INTERNAL MEDICINE | Facility: CLINIC | Age: 55
End: 2025-06-06
Payer: COMMERCIAL

## 2025-06-06 NOTE — TELEPHONE ENCOUNTER
Left message for patient. Paperwork  from Dr. Petersen will be ready for pickup today after 1:00 PM.

## 2025-06-12 ENCOUNTER — TELEPHONE (OUTPATIENT)
Dept: INTERNAL MEDICINE | Facility: CLINIC | Age: 55
End: 2025-06-12
Payer: COMMERCIAL

## 2025-06-12 NOTE — TELEPHONE ENCOUNTER
Patient called. Patient sent Dr. Petersen a text message about FMLA paper work. Section 3 on physician statement needs to be yes instead of no and the date needs to be 4/22/2025-5/22/2025. Call when papers are ready.  Call: [phone]

## 2025-06-13 NOTE — TELEPHONE ENCOUNTER
Patient is aware that [facility] paperwork has been faxed to [employer] and a copy is at the front office for her to .   Paperwork faxed to: [phone]

## 2025-06-16 ENCOUNTER — TELEPHONE (OUTPATIENT)
Dept: INTERNAL MEDICINE | Facility: CLINIC | Age: 55
End: 2025-06-16
Payer: COMMERCIAL

## 2025-06-16 DIAGNOSIS — I10 ESSENTIAL HYPERTENSION: Primary | ICD-10-CM

## 2025-06-16 NOTE — TELEPHONE ENCOUNTER
Left message voicemail for patient that we were returning her call and to please call back.  Ofc. # given.      RELAY:  She is not consuming enough calories therefore slowing down her metabolism.  Needs to have 1000 - 1200 calories per day.     Document if notified.

## 2025-06-16 NOTE — TELEPHONE ENCOUNTER
Patient has been on phentermine for 2 weeks and eating around 500 calories per day and hasn't been effective yet. Call patient to discuss [phone]

## 2025-06-17 NOTE — TELEPHONE ENCOUNTER
Left message voicemail for patient that we were trying to reach her back and to please return call.  Ofc. # given.

## 2025-06-17 NOTE — TELEPHONE ENCOUNTER
Patient was read the message from Dr. Petersen. Patient stated that she has been taking 6168-4353 calories and it has caused excessive weight gain, 7.5 pounds in 2 weeks. She wants to talk to Chrissie and the best time to reach her is around 12:00 PM or after 5:00 PM.  Call: [phone]

## 2025-06-18 NOTE — TELEPHONE ENCOUNTER
Spoke with patient, states she needs something else to help with her weight.  States she has been on Phentermine for 2 1/2 weeks and still gaining weight.  States first week on that she consumed 5065-6580 kaitlin per day and gained several pounds the following week she cut back to 500 kaitlin per day and continued to gain but not as much.  States her weight gain has been out of control since menopause and that she feels it is all related to hormones. States she feels she is retaining fluid but most in her belly, states none in ankles or fingers.  States she is very active and walks several miles per day.  Asked if she would be interested in dietician consult.  Declined. States her weight this morning was 177.3.  Wants to know what else Dr Petersen can order for her.

## 2025-06-19 NOTE — TELEPHONE ENCOUNTER
Please have her stop back in for some additional lab tests such as thyroid, etc.  I have put in orders.

## 2025-06-19 NOTE — TELEPHONE ENCOUNTER
"Left message voicemail for patient to please return call.  Ofc # given.     RELAY:  Dr Petersen said \"Please have her stop back in for some additional lab tests such as thyroid, etc.  I have put in orders.\" Lab hours are 8a - 4p Monday - Friday.      Document if notified.    "

## 2025-06-20 NOTE — TELEPHONE ENCOUNTER
"Attempt to call patient, no answer    Not able to leave vm      PLEASE RELAY:       RELAY:  Dr Petersen said \"Please have her stop back in for some additional lab tests such as thyroid, etc.  I have put in orders.\" Lab hours are 8a - 4p Monday - Friday.      Document if notified.    "

## 2025-06-20 NOTE — TELEPHONE ENCOUNTER
Patient called. She wants to wait until after 9/20/2025 to have labs done since insurance tends to only cover labs once a year. She wants to know to if there is an alternative plan to help with her weight gain.   Call: [phone]

## 2025-06-30 NOTE — TELEPHONE ENCOUNTER
We’ve discussed GLP-1 medications as a possible treatment to support weight loss and improve metabolic health. These medications work by mimicking natural hormones that regulate appetite, slow digestion, and help control blood sugar levels. Two options that are FDA-approved specifically for weight management are:    Wegovy (semaglutide): This is a once-weekly injection approved for chronic weight management. It is the same active ingredient as Ozempic, but Wegovy is specifically labeled for weight loss. The retail cost at most local pharmacies is around $1,350-$1,500 per month without insurance. However, the  ([employer]) offers a savings program through their own pharmacy partner (such as [facility]) where the medication may be available for as low as $199/month for eligible patients, depending on income and insurance status.    Zepbound (tirzepatide): This is also a weekly injection and has shown significant effectiveness for weight loss. It works through both GLP-1 and GIP pathways. The retail pharmacy price is typically around $1,060-$1,200 per month. The  ([employer]) offers a savings program through their pharmacy partners (such as [facility]) where eligible patients without insurance coverage may qualify for a reduced monthly price of $550/month.    These medications are not typically covered by most commercial insurance plans unless you have a specific diagnosis like type 2 diabetes or meet strict criteria. Although we can try to submit a prior authorization request to your insurance, please be aware that approval is unlikely based on current insurance trends.    If you would like to start one of these medications based on the information above, please contact me. I’d be happy to initiate a prescription and guide you through the process of working with the ’s pharmacy programs to reduce the cost as much as possible.    Abdullahi Petersen MD

## 2025-07-07 ENCOUNTER — TELEPHONE (OUTPATIENT)
Dept: INTERNAL MEDICINE | Facility: CLINIC | Age: 55
End: 2025-07-07
Payer: COMMERCIAL

## 2025-07-07 NOTE — TELEPHONE ENCOUNTER
Patient called. She had anxiety and panic attack and was unable to go to work today. She had side effects from medication she took for the anxiety. Patient wants a work excuse for today before 12:00 PM. It can be sent to her MyChart.   Call: [phone]

## 2025-07-14 ENCOUNTER — TELEPHONE (OUTPATIENT)
Dept: INTERNAL MEDICINE | Facility: CLINIC | Age: 55
End: 2025-07-14

## 2025-07-14 DIAGNOSIS — R11.0 NAUSEA: ICD-10-CM

## 2025-07-14 RX ORDER — ONDANSETRON 4 MG/1
4 TABLET, ORALLY DISINTEGRATING ORAL EVERY 8 HOURS PRN
Qty: 30 TABLET | Refills: 2 | Status: SHIPPED | OUTPATIENT
Start: 2025-07-14

## 2025-07-14 RX ORDER — ONDANSETRON 4 MG/1
4 TABLET, ORALLY DISINTEGRATING ORAL EVERY 8 HOURS PRN
Qty: 30 TABLET | Refills: 2 | Status: SHIPPED | OUTPATIENT
Start: 2025-07-14 | End: 2025-07-14 | Stop reason: SDUPTHER

## 2025-07-16 ENCOUNTER — TELEPHONE (OUTPATIENT)
Dept: INTERNAL MEDICINE | Facility: CLINIC | Age: 55
End: 2025-07-16
Payer: COMMERCIAL

## 2025-07-16 NOTE — TELEPHONE ENCOUNTER
Patient had an anxiety attack and this has been on going now and needs a work accuse for today 7/16/25 and can return back to work tomorrow 7/17/25. She needs this letter before noon today.

## 2025-08-28 DIAGNOSIS — I10 ESSENTIAL HYPERTENSION: ICD-10-CM

## 2025-08-29 RX ORDER — TRIAMTERENE AND HYDROCHLOROTHIAZIDE 37.5; 25 MG/1; MG/1
1 CAPSULE ORAL EVERY MORNING
Qty: 30 CAPSULE | Refills: 5 | Status: SHIPPED | OUTPATIENT
Start: 2025-08-29